# Patient Record
Sex: MALE | Race: WHITE | NOT HISPANIC OR LATINO
[De-identification: names, ages, dates, MRNs, and addresses within clinical notes are randomized per-mention and may not be internally consistent; named-entity substitution may affect disease eponyms.]

---

## 2018-05-17 ENCOUNTER — TRANSCRIPTION ENCOUNTER (OUTPATIENT)
Age: 33
End: 2018-05-17

## 2018-10-09 ENCOUNTER — APPOINTMENT (OUTPATIENT)
Dept: ULTRASOUND IMAGING | Facility: HOSPITAL | Age: 33
End: 2018-10-09

## 2018-10-12 ENCOUNTER — APPOINTMENT (OUTPATIENT)
Dept: CV DIAGNOSITCS | Facility: HOSPITAL | Age: 33
End: 2018-10-12
Payer: COMMERCIAL

## 2018-10-12 ENCOUNTER — OUTPATIENT (OUTPATIENT)
Dept: OUTPATIENT SERVICES | Facility: HOSPITAL | Age: 33
LOS: 1 days | End: 2018-10-12

## 2018-10-12 DIAGNOSIS — I11.9 HYPERTENSIVE HEART DISEASE WITHOUT HEART FAILURE: ICD-10-CM

## 2018-10-12 PROCEDURE — 93306 TTE W/DOPPLER COMPLETE: CPT | Mod: 26

## 2018-10-26 ENCOUNTER — TRANSCRIPTION ENCOUNTER (OUTPATIENT)
Age: 33
End: 2018-10-26

## 2019-02-27 ENCOUNTER — APPOINTMENT (OUTPATIENT)
Dept: OTOLARYNGOLOGY | Facility: CLINIC | Age: 34
End: 2019-02-27
Payer: COMMERCIAL

## 2019-02-27 VITALS
DIASTOLIC BLOOD PRESSURE: 79 MMHG | HEART RATE: 75 BPM | BODY MASS INDEX: 31.84 KG/M2 | SYSTOLIC BLOOD PRESSURE: 132 MMHG | WEIGHT: 215 LBS | HEIGHT: 69 IN

## 2019-02-27 DIAGNOSIS — J34.2 DEVIATED NASAL SEPTUM: ICD-10-CM

## 2019-02-27 DIAGNOSIS — G47.33 OBSTRUCTIVE SLEEP APNEA (ADULT) (PEDIATRIC): ICD-10-CM

## 2019-02-27 PROCEDURE — 99204 OFFICE O/P NEW MOD 45 MIN: CPT

## 2019-02-27 NOTE — HISTORY OF PRESENT ILLNESS
[de-identified] : Mr. COMER is a 33 year male referred for deviated septum and mild YAIMA. \par no daytime sleepiness, previously had nose bleeds then found to have elevated BP.  PCP working up causes of HTN sent for PST - demonstrated AHI 4.3\par - nose bleeds resolved with htn tx. \par + snoring\par  denies sinus pressure, rhinorrhea, nasal obstruction

## 2019-02-27 NOTE — CONSULT LETTER
[Dear  ___] : Dear  [unfilled], [Consult Letter:] : I had the pleasure of evaluating your patient, [unfilled]. [Please see my note below.] : Please see my note below. [Consult Closing:] : Thank you very much for allowing me to participate in the care of this patient.  If you have any questions, please do not hesitate to contact me. [FreeTextEntry3] : Sincerely, \par \par Marisela Rudd MD\par Otolaryngology- Facial Plastics \par [address]\[place] [address]\par (P) - [phone]\par (F) - [phone]

## 2019-02-27 NOTE — ASSESSMENT
[FreeTextEntry1] : Mr. COMER is a 33 year male with borderline YAIMA (AHI 4.3 - advised that typically AHI > 5 is diagnostic of sleep apnea in an adult).  \par - offered referral to pulmonary - sleep study was done at home and may not be reliable\par - septoplasty would not affect sleep apnea, only cpap compliance\par - no signifcant nasal obstruciton\par - epistaxis resolved\par - small tonsils, short palate\par - do not recommend septoplasty or any sleep surgery at this time\par - will call if nasal obstruction becomes bothersome or if he wants to see pulmonary.  He declines at this time.

## 2019-02-27 NOTE — PHYSICAL EXAM
[] : septum deviated to the right [Midline] : trachea located in midline position [Normal] : no rashes

## 2019-03-21 ENCOUNTER — TRANSCRIPTION ENCOUNTER (OUTPATIENT)
Age: 34
End: 2019-03-21

## 2019-08-21 ENCOUNTER — APPOINTMENT (OUTPATIENT)
Dept: ALLERGY | Facility: CLINIC | Age: 34
End: 2019-08-21
Payer: COMMERCIAL

## 2019-08-21 VITALS
WEIGHT: 211 LBS | DIASTOLIC BLOOD PRESSURE: 89 MMHG | OXYGEN SATURATION: 98 % | HEART RATE: 70 BPM | SYSTOLIC BLOOD PRESSURE: 142 MMHG | BODY MASS INDEX: 31.25 KG/M2 | HEIGHT: 69 IN

## 2019-08-21 DIAGNOSIS — Z87.891 PERSONAL HISTORY OF NICOTINE DEPENDENCE: ICD-10-CM

## 2019-08-21 PROCEDURE — 99204 OFFICE O/P NEW MOD 45 MIN: CPT | Mod: 25

## 2019-08-21 PROCEDURE — 95018 ALL TSTG PERQ&IQ DRUGS/BIOL: CPT

## 2019-08-21 PROCEDURE — 95004 PERQ TESTS W/ALRGNC XTRCS: CPT

## 2019-08-21 RX ORDER — AZELASTINE HYDROCHLORIDE 137 UG/1
0.1 SPRAY, METERED NASAL TWICE DAILY
Qty: 1 | Refills: 2 | Status: ACTIVE | COMMUNITY
Start: 2019-08-21 | End: 1900-01-01

## 2019-08-21 NOTE — SOCIAL HISTORY
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

## 2019-08-21 NOTE — PHYSICAL EXAM
[Alert] : alert [Well Nourished] : well nourished [Healthy Appearance] : healthy appearance [No Acute Distress] : no acute distress [Well Developed] : well developed [No Discharge] : no discharge [Normal Pupil & Iris Size/Symmetry] : normal pupil and iris size and symmetry [No Photophobia] : no photophobia [Sclera Not Icteric] : sclera not icteric [Normal Lips/Tongue] : the lips and tongue were normal [Normal Nasal Mucosa] : the nasal mucosa was normal [Normal Tonsils] : normal tonsils [Normal Dentition] : normal dentition [No Oral Lesions or Ulcers] : no oral lesions or ulcers [Boggy Nasal Turbinates] : boggy and/or pale nasal turbinates [Clear Rhinorrhea] : clear rhinorrhea was seen [No Neck Mass] : no neck mass was observed [No LAD] : no lymphadenopathy [No Thyroid Mass] : no thyroid mass [Supple] : the neck was supple [Normal Rate and Effort] : normal respiratory rhythm and effort [No Crackles] : no crackles [Bilateral Audible Breath Sounds] : bilateral audible breath sounds [Normal Rate] : heart rate was normal  [Normal S1, S2] : normal S1 and S2 [No murmur] : no murmur [Regular Rhythm] : with a regular rhythm [Normal Cervical Lymph Nodes] : cervical [Normal Axillary Lumph Nodes] : axillary [No Rash] : no rash [Skin Intact] : skin intact  [No Skin Lesions] : no skin lesions [No Joint Swelling or Erythema] : no joint swelling or erythema [No clubbing] : no clubbing [No Edema] : no edema [No Cyanosis] : no cyanosis [Normal Mood] : mood was normal [Normal Affect] : affect was normal [Alert, Awake, Oriented as Age-Appropriate] : alert, awake, oriented as age appropriate

## 2019-08-21 NOTE — HISTORY OF PRESENT ILLNESS
[Asthma] : asthma [Eczematous rashes] : eczematous rashes [Venom Reactions] : venom reactions [Food Allergies] : food allergies [de-identified] : Nasal blockage, rhinorrhea, sneezing - worse over the past 2-3 months - he is avoiding medications secondary to his hypertension.  No associated SOB or wheezing.  In the past he has had mild fall and winter allergies.   Problems sleeping at night secondary to snoring and problems breathing thru nose.

## 2019-08-21 NOTE — ASSESSMENT
[FreeTextEntry1] : Perennial allergic rhinitis:\par \par RV environmental intradermal skin testing \par Flonase 2 puffs QD \par Azelastine BID

## 2019-09-03 ENCOUNTER — APPOINTMENT (OUTPATIENT)
Dept: ALLERGY | Facility: CLINIC | Age: 34
End: 2019-09-03
Payer: COMMERCIAL

## 2019-09-03 VITALS
HEART RATE: 76 BPM | BODY MASS INDEX: 31.84 KG/M2 | DIASTOLIC BLOOD PRESSURE: 84 MMHG | WEIGHT: 215 LBS | HEIGHT: 69 IN | OXYGEN SATURATION: 99 % | SYSTOLIC BLOOD PRESSURE: 122 MMHG

## 2019-09-03 PROCEDURE — 95024 IQ TESTS W/ALLERGENIC XTRCS: CPT

## 2019-09-03 PROCEDURE — 95018 ALL TSTG PERQ&IQ DRUGS/BIOL: CPT

## 2019-09-03 NOTE — ASSESSMENT
[FreeTextEntry1] : Seasonal allergic rhinitis:\par \par Flonase 2 puffs QD thru the end of November\par Azelastine BID prn\par RV prn

## 2019-09-03 NOTE — REASON FOR VISIT
[Routine Follow-Up] : a routine follow-up visit for [FreeTextEntry3] : allergy skin testing completion

## 2019-09-29 ENCOUNTER — TRANSCRIPTION ENCOUNTER (OUTPATIENT)
Age: 34
End: 2019-09-29

## 2019-11-01 ENCOUNTER — NON-APPOINTMENT (OUTPATIENT)
Age: 34
End: 2019-11-01

## 2019-11-01 ENCOUNTER — APPOINTMENT (OUTPATIENT)
Dept: INTERNAL MEDICINE | Facility: CLINIC | Age: 34
End: 2019-11-01
Payer: COMMERCIAL

## 2019-11-01 VITALS
SYSTOLIC BLOOD PRESSURE: 148 MMHG | HEIGHT: 69 IN | BODY MASS INDEX: 31.7 KG/M2 | WEIGHT: 214 LBS | RESPIRATION RATE: 16 BRPM | TEMPERATURE: 98.9 F | OXYGEN SATURATION: 97 % | DIASTOLIC BLOOD PRESSURE: 88 MMHG | HEART RATE: 85 BPM

## 2019-11-01 DIAGNOSIS — Z80.3 FAMILY HISTORY OF MALIGNANT NEOPLASM OF BREAST: ICD-10-CM

## 2019-11-01 DIAGNOSIS — Z82.49 FAMILY HISTORY OF ISCHEMIC HEART DISEASE AND OTHER DISEASES OF THE CIRCULATORY SYSTEM: ICD-10-CM

## 2019-11-01 PROCEDURE — G0444 DEPRESSION SCREEN ANNUAL: CPT | Mod: 59

## 2019-11-01 PROCEDURE — 36415 COLL VENOUS BLD VENIPUNCTURE: CPT

## 2019-11-01 PROCEDURE — 99385 PREV VISIT NEW AGE 18-39: CPT | Mod: 25

## 2019-11-01 PROCEDURE — 93000 ELECTROCARDIOGRAM COMPLETE: CPT

## 2019-11-01 NOTE — HEALTH RISK ASSESSMENT
[11-15] : 11-15 [Yes] : Yes [No falls in past year] : Patient reported no falls in the past year [0] : 2) Feeling down, depressed, or hopeless: Not at all (0) [With Significant Other] : lives with significant other [] :  [Fully functional (bathing, dressing, toileting, transferring, walking, feeding)] : Fully functional (bathing, dressing, toileting, transferring, walking, feeding) [Fully functional (using the telephone, shopping, preparing meals, housekeeping, doing laundry, using] : Fully functional and needs no help or supervision to perform IADLs (using the telephone, shopping, preparing meals, housekeeping, doing laundry, using transportation, managing medications and managing finances) [Smoke Detector] : smoke detector [Carbon Monoxide Detector] : carbon monoxide detector [Seat Belt] :  uses seat belt [Sunscreen] : uses sunscreen [YearQuit] : 2012 [de-identified] : social [OFG2Qwfgu] : 0 [Change in mental status noted] : No change in mental status noted [Reports changes in hearing] : Reports no changes in hearing [Reports changes in vision] : Reports no changes in vision [Reports changes in dental health] : Reports no changes in dental health [Guns at Home] : no guns at home [Travel to Developing Areas] : does not  travel to developing areas [TB Exposure] : is not being exposed to tuberculosis [Caregiver Concerns] : does not have caregiver concerns [AdvancecareDate] : 11/1/19

## 2019-11-01 NOTE — PHYSICAL EXAM
[No Acute Distress] : no acute distress [Well Nourished] : well nourished [Well Developed] : well developed [Well-Appearing] : well-appearing [Normal Sclera/Conjunctiva] : normal sclera/conjunctiva [PERRL] : pupils equal round and reactive to light [EOMI] : extraocular movements intact [Normal Outer Ear/Nose] : the outer ears and nose were normal in appearance [Normal Oropharynx] : the oropharynx was normal [No JVD] : no jugular venous distention [No Lymphadenopathy] : no lymphadenopathy [Supple] : supple [Thyroid Normal, No Nodules] : the thyroid was normal and there were no nodules present [No Respiratory Distress] : no respiratory distress  [No Accessory Muscle Use] : no accessory muscle use [Clear to Auscultation] : lungs were clear to auscultation bilaterally [Normal Rate] : normal rate  [Regular Rhythm] : with a regular rhythm [Normal S1, S2] : normal S1 and S2 [No Murmur] : no murmur heard [No Carotid Bruits] : no carotid bruits [No Abdominal Bruit] : a ~M bruit was not heard ~T in the abdomen [No Varicosities] : no varicosities [Pedal Pulses Present] : the pedal pulses are present [No Edema] : there was no peripheral edema [No Palpable Aorta] : no palpable aorta [No Extremity Clubbing/Cyanosis] : no extremity clubbing/cyanosis [Soft] : abdomen soft [Non Tender] : non-tender [Non-distended] : non-distended [No Masses] : no abdominal mass palpated [No HSM] : no HSM [Normal Bowel Sounds] : normal bowel sounds [Normal Posterior Cervical Nodes] : no posterior cervical lymphadenopathy [Normal Anterior Cervical Nodes] : no anterior cervical lymphadenopathy [No CVA Tenderness] : no CVA  tenderness [No Spinal Tenderness] : no spinal tenderness [No Joint Swelling] : no joint swelling [Grossly Normal Strength/Tone] : grossly normal strength/tone [No Rash] : no rash [Coordination Grossly Intact] : coordination grossly intact [No Focal Deficits] : no focal deficits [Normal Gait] : normal gait [Deep Tendon Reflexes (DTR)] : deep tendon reflexes were 2+ and symmetric [Normal Affect] : the affect was normal [Normal Insight/Judgement] : insight and judgment were intact [de-identified] : no abd bruits

## 2019-11-01 NOTE — HISTORY OF PRESENT ILLNESS
[FreeTextEntry1] : cpx [de-identified] : work up for secondary htn revealed left renal artery stenosis as noted. Get occ freq of urination from diuretic--home bps gen controlled

## 2019-11-02 LAB
ALBUMIN SERPL ELPH-MCNC: 5.2 G/DL
ALP BLD-CCNC: 62 U/L
ALT SERPL-CCNC: 45 U/L
ANION GAP SERPL CALC-SCNC: 15 MMOL/L
APPEARANCE: CLEAR
AST SERPL-CCNC: 25 U/L
BACTERIA: NEGATIVE
BASOPHILS # BLD AUTO: 0.07 K/UL
BASOPHILS NFR BLD AUTO: 0.8 %
BILIRUB SERPL-MCNC: 1 MG/DL
BILIRUBIN URINE: NEGATIVE
BLOOD URINE: NEGATIVE
BUN SERPL-MCNC: 18 MG/DL
CALCIUM SERPL-MCNC: 10 MG/DL
CHLORIDE SERPL-SCNC: 96 MMOL/L
CHOLEST SERPL-MCNC: 188 MG/DL
CHOLEST/HDLC SERPL: 4.2 RATIO
CO2 SERPL-SCNC: 29 MMOL/L
COLOR: YELLOW
CREAT SERPL-MCNC: 1.16 MG/DL
EOSINOPHIL # BLD AUTO: 0.29 K/UL
EOSINOPHIL NFR BLD AUTO: 3.4 %
GLUCOSE QUALITATIVE U: NEGATIVE
GLUCOSE SERPL-MCNC: 91 MG/DL
HCT VFR BLD CALC: 49.4 %
HDLC SERPL-MCNC: 45 MG/DL
HGB BLD-MCNC: 16 G/DL
HYALINE CASTS: 1 /LPF
IMM GRANULOCYTES NFR BLD AUTO: 0.2 %
KETONES URINE: NEGATIVE
LDLC SERPL CALC-MCNC: 116 MG/DL
LEUKOCYTE ESTERASE URINE: NEGATIVE
LYMPHOCYTES # BLD AUTO: 2.92 K/UL
LYMPHOCYTES NFR BLD AUTO: 34.6 %
MAN DIFF?: NORMAL
MCHC RBC-ENTMCNC: 28.9 PG
MCHC RBC-ENTMCNC: 32.4 GM/DL
MCV RBC AUTO: 89.3 FL
MICROSCOPIC-UA: NORMAL
MONOCYTES # BLD AUTO: 0.71 K/UL
MONOCYTES NFR BLD AUTO: 8.4 %
NEUTROPHILS # BLD AUTO: 4.43 K/UL
NEUTROPHILS NFR BLD AUTO: 52.6 %
NITRITE URINE: NEGATIVE
PH URINE: 7
PLATELET # BLD AUTO: 237 K/UL
POTASSIUM SERPL-SCNC: 3.6 MMOL/L
PROT SERPL-MCNC: 7.5 G/DL
PROTEIN URINE: NORMAL
RBC # BLD: 5.53 M/UL
RBC # FLD: 13.5 %
RED BLOOD CELLS URINE: 1 /HPF
SODIUM SERPL-SCNC: 140 MMOL/L
SPECIFIC GRAVITY URINE: 1.02
SQUAMOUS EPITHELIAL CELLS: 0 /HPF
TRIGL SERPL-MCNC: 134 MG/DL
TSH SERPL-ACNC: 1.64 UIU/ML
UROBILINOGEN URINE: NORMAL
WBC # FLD AUTO: 8.44 K/UL
WHITE BLOOD CELLS URINE: 0 /HPF

## 2019-12-09 ENCOUNTER — MEDICATION RENEWAL (OUTPATIENT)
Age: 34
End: 2019-12-09

## 2019-12-09 RX ORDER — FLUTICASONE PROPIONATE 50 UG/1
50 SPRAY, METERED NASAL DAILY
Qty: 1 | Refills: 2 | Status: ACTIVE | COMMUNITY
Start: 2019-08-21 | End: 1900-01-01

## 2020-01-17 ENCOUNTER — APPOINTMENT (OUTPATIENT)
Dept: UROLOGY | Facility: CLINIC | Age: 35
End: 2020-01-17
Payer: COMMERCIAL

## 2020-01-17 VITALS
RESPIRATION RATE: 14 BRPM | TEMPERATURE: 98.6 F | HEIGHT: 69 IN | WEIGHT: 218 LBS | SYSTOLIC BLOOD PRESSURE: 137 MMHG | BODY MASS INDEX: 32.29 KG/M2 | DIASTOLIC BLOOD PRESSURE: 84 MMHG | HEART RATE: 88 BPM | OXYGEN SATURATION: 98 %

## 2020-01-17 DIAGNOSIS — Z86.39 PERSONAL HISTORY OF OTHER ENDOCRINE, NUTRITIONAL AND METABOLIC DISEASE: ICD-10-CM

## 2020-01-17 DIAGNOSIS — Z86.79 PERSONAL HISTORY OF OTHER DISEASES OF THE CIRCULATORY SYSTEM: ICD-10-CM

## 2020-01-17 DIAGNOSIS — Z80.42 FAMILY HISTORY OF MALIGNANT NEOPLASM OF PROSTATE: ICD-10-CM

## 2020-01-17 PROCEDURE — 99203 OFFICE O/P NEW LOW 30 MIN: CPT

## 2020-01-17 NOTE — HISTORY OF PRESENT ILLNESS
[FreeTextEntry1] : He is a 34-year-old man who is seen today for initial visit with his wife. He has 2 children. He is interested in vasectomy. He does not have any urological complaints. There is a family history of urological diseases.

## 2020-01-17 NOTE — REVIEW OF SYSTEMS
[Abdominal Pain] : abdominal pain [History of kidney stones] : history of kidney stones [Itching] : itching [Negative] : Heme/Lymph [FreeTextEntry2] : high blood pressure

## 2020-01-17 NOTE — PHYSICAL EXAM
[General Appearance - Well Developed] : well developed [General Appearance - Well Nourished] : well nourished [Normal Appearance] : normal appearance [Well Groomed] : well groomed [General Appearance - In No Acute Distress] : no acute distress [Edema] : no peripheral edema [Respiration, Rhythm And Depth] : normal respiratory rhythm and effort [Exaggerated Use Of Accessory Muscles For Inspiration] : no accessory muscle use [Abdomen Soft] : soft [Abdomen Tenderness] : non-tender [Costovertebral Angle Tenderness] : no ~M costovertebral angle tenderness [Urethral Meatus] : meatus normal [Penis Abnormality] : normal circumcised penis [Urinary Bladder Findings] : the bladder was normal on palpation [Scrotum] : the scrotum was normal [Testes Tenderness] : no tenderness of the testes [Testes Mass (___cm)] : there were no testicular masses [Normal Station and Gait] : the gait and station were normal for the patient's age [No Focal Deficits] : no focal deficits [] : no rash [Oriented To Time, Place, And Person] : oriented to person, place, and time [Mood] : the mood was normal [Affect] : the affect was normal [Not Anxious] : not anxious [Inguinal Lymph Nodes Enlarged Bilaterally] : inguinal

## 2020-01-17 NOTE — ASSESSMENT
[FreeTextEntry1] : He is interested in proceeding with vasectomy. Different surgical techniques and their outcomes were discussed. Risks of the procedure discussed included but were not limited to bleeding, infection, testicular injury or atrophy, failure of the procedure and need for redo procedure, etc. Post-vasectomy pain syndrome was discussed. Also, studies discussing the link between vasectomy and prostate cancer were discussed. Use of local vs. general anesthesia was discussed. The procedure will be scheduled in the near future.\par \par Zheng Wynne MD, FACS\par [facility]\par  of Urology\par \par [address], [address]\[place] [address]\par \par [address], [address]\[place] [address]\par \par Tel: [phone]\par Fax: [phone]

## 2020-02-19 ENCOUNTER — APPOINTMENT (OUTPATIENT)
Dept: MRI IMAGING | Facility: CLINIC | Age: 35
End: 2020-02-19
Payer: COMMERCIAL

## 2020-02-19 ENCOUNTER — OUTPATIENT (OUTPATIENT)
Dept: OUTPATIENT SERVICES | Facility: HOSPITAL | Age: 35
LOS: 1 days | End: 2020-02-19
Payer: COMMERCIAL

## 2020-02-19 DIAGNOSIS — M99.03 SEGMENTAL AND SOMATIC DYSFUNCTION OF LUMBAR REGION: ICD-10-CM

## 2020-02-19 PROCEDURE — 72148 MRI LUMBAR SPINE W/O DYE: CPT | Mod: 26

## 2020-02-19 PROCEDURE — 72148 MRI LUMBAR SPINE W/O DYE: CPT

## 2020-03-06 ENCOUNTER — LABORATORY RESULT (OUTPATIENT)
Age: 35
End: 2020-03-06

## 2020-03-06 ENCOUNTER — APPOINTMENT (OUTPATIENT)
Dept: UROLOGY | Facility: CLINIC | Age: 35
End: 2020-03-06
Payer: COMMERCIAL

## 2020-03-06 VITALS
HEIGHT: 69 IN | SYSTOLIC BLOOD PRESSURE: 139 MMHG | BODY MASS INDEX: 32.29 KG/M2 | HEART RATE: 91 BPM | WEIGHT: 218 LBS | OXYGEN SATURATION: 99 % | DIASTOLIC BLOOD PRESSURE: 79 MMHG | RESPIRATION RATE: 14 BRPM

## 2020-03-06 PROCEDURE — 55250 REMOVAL OF SPERM DUCT(S): CPT

## 2020-04-26 ENCOUNTER — MESSAGE (OUTPATIENT)
Age: 35
End: 2020-04-26

## 2020-04-30 ENCOUNTER — APPOINTMENT (OUTPATIENT)
Dept: UROLOGY | Facility: CLINIC | Age: 35
End: 2020-04-30

## 2020-04-30 LAB
SARS-COV-2 IGG SERPL IA-ACNC: <0.1 INDEX
SARS-COV-2 IGG SERPL QL IA: NEGATIVE

## 2020-06-10 ENCOUNTER — APPOINTMENT (OUTPATIENT)
Dept: UROLOGY | Facility: CLINIC | Age: 35
End: 2020-06-10
Payer: COMMERCIAL

## 2020-06-10 VITALS
BODY MASS INDEX: 32.29 KG/M2 | TEMPERATURE: 98.6 F | OXYGEN SATURATION: 98 % | RESPIRATION RATE: 13 BRPM | SYSTOLIC BLOOD PRESSURE: 135 MMHG | HEIGHT: 69 IN | DIASTOLIC BLOOD PRESSURE: 86 MMHG | HEART RATE: 94 BPM | WEIGHT: 218 LBS

## 2020-06-10 DIAGNOSIS — Z30.09 ENCOUNTER FOR OTHER GENERAL COUNSELING AND ADVICE ON CONTRACEPTION: ICD-10-CM

## 2020-06-10 PROCEDURE — 99213 OFFICE O/P EST LOW 20 MIN: CPT

## 2020-06-10 NOTE — ASSESSMENT
[FreeTextEntry1] : His examination is unremarkable. He does not have any pain. It has been 3 months since his procedure. He can go ahead and give specimen for semen analysis. He should continue to use barrier protection until results come back.

## 2020-06-10 NOTE — PHYSICAL EXAM
[General Appearance - Well Developed] : well developed [General Appearance - Well Nourished] : well nourished [Normal Appearance] : normal appearance [General Appearance - In No Acute Distress] : no acute distress [Well Groomed] : well groomed [Abdomen Tenderness] : non-tender [Abdomen Soft] : soft [Costovertebral Angle Tenderness] : no ~M costovertebral angle tenderness [Urethral Meatus] : meatus normal [Penis Abnormality] : normal circumcised penis [Urinary Bladder Findings] : the bladder was normal on palpation [Testes Mass (___cm)] : there were no testicular masses [Testes Tenderness] : no tenderness of the testes [Scrotum] : the scrotum was normal [Respiration, Rhythm And Depth] : normal respiratory rhythm and effort [] : no respiratory distress [Exaggerated Use Of Accessory Muscles For Inspiration] : no accessory muscle use

## 2020-06-10 NOTE — HISTORY OF PRESENT ILLNESS
[FreeTextEntry1] : He is a 35 year-old man who is seen today after vasectomy on 3/6/2020. He does not have any urological complaints. Postvasectomy pain and discomfort have resolved.

## 2020-07-22 ENCOUNTER — RX RENEWAL (OUTPATIENT)
Age: 35
End: 2020-07-22

## 2020-08-10 ENCOUNTER — APPOINTMENT (OUTPATIENT)
Dept: INTERNAL MEDICINE | Facility: CLINIC | Age: 35
End: 2020-08-10
Payer: COMMERCIAL

## 2020-08-10 VITALS
DIASTOLIC BLOOD PRESSURE: 84 MMHG | OXYGEN SATURATION: 98 % | WEIGHT: 212 LBS | SYSTOLIC BLOOD PRESSURE: 128 MMHG | RESPIRATION RATE: 16 BRPM | HEART RATE: 91 BPM | TEMPERATURE: 98.6 F | BODY MASS INDEX: 31.4 KG/M2 | HEIGHT: 69 IN

## 2020-08-10 PROCEDURE — 99213 OFFICE O/P EST LOW 20 MIN: CPT | Mod: 25

## 2020-08-10 PROCEDURE — 36415 COLL VENOUS BLD VENIPUNCTURE: CPT

## 2020-08-10 RX ORDER — TRIAMTERENE AND HYDROCHLOROTHIAZIDE 25; 37.5 MG/1; MG/1
37.5-25 TABLET ORAL
Qty: 90 | Refills: 1 | Status: ACTIVE | COMMUNITY

## 2020-08-10 NOTE — HISTORY OF PRESENT ILLNESS
[Hyperlipidemia] : Hyperlipidemia [Patient was last seen on ___] : Patient was last seen on [unfilled] [Hypertension] : Hypertension [Does not check BP] : The patient is not checking blood pressure [<140/90] : Target blood pressure is <140/90 [Target goal met] : BP target goal met [Doing Well] : Patient is doing well [Managed with medications] : managed with  medication

## 2020-08-10 NOTE — PHYSICAL EXAM
[Pedal Pulses Present] : the pedal pulses are present [No Varicosities] : no varicosities [No Extremity Clubbing/Cyanosis] : no extremity clubbing/cyanosis [No Edema] : there was no peripheral edema [Normal] : affect was normal and insight and judgment were intact

## 2020-08-11 LAB
ALBUMIN SERPL ELPH-MCNC: 5.3 G/DL
ALP BLD-CCNC: 58 U/L
ALT SERPL-CCNC: 36 U/L
ANION GAP SERPL CALC-SCNC: 12 MMOL/L
AST SERPL-CCNC: 20 U/L
BASOPHILS # BLD AUTO: 0.06 K/UL
BASOPHILS NFR BLD AUTO: 0.8 %
BILIRUB SERPL-MCNC: 1 MG/DL
BUN SERPL-MCNC: 18 MG/DL
CALCIUM SERPL-MCNC: 9.9 MG/DL
CHLORIDE SERPL-SCNC: 98 MMOL/L
CHOLEST SERPL-MCNC: 173 MG/DL
CHOLEST/HDLC SERPL: 4.2 RATIO
CO2 SERPL-SCNC: 30 MMOL/L
CREAT SERPL-MCNC: 1.21 MG/DL
EOSINOPHIL # BLD AUTO: 0.26 K/UL
EOSINOPHIL NFR BLD AUTO: 3.5 %
FERRITIN SERPL-MCNC: 152 NG/ML
GLUCOSE SERPL-MCNC: 100 MG/DL
HCT VFR BLD CALC: 50.9 %
HDLC SERPL-MCNC: 41 MG/DL
HGB BLD-MCNC: 16.5 G/DL
IMM GRANULOCYTES NFR BLD AUTO: 0.1 %
IRON SATN MFR SERPL: 26 %
IRON SERPL-MCNC: 89 UG/DL
LDLC SERPL CALC-MCNC: 100 MG/DL
LYMPHOCYTES # BLD AUTO: 2.35 K/UL
LYMPHOCYTES NFR BLD AUTO: 31.8 %
MAN DIFF?: NORMAL
MCHC RBC-ENTMCNC: 29.3 PG
MCHC RBC-ENTMCNC: 32.4 GM/DL
MCV RBC AUTO: 90.4 FL
MONOCYTES # BLD AUTO: 0.73 K/UL
MONOCYTES NFR BLD AUTO: 9.9 %
NEUTROPHILS # BLD AUTO: 3.99 K/UL
NEUTROPHILS NFR BLD AUTO: 53.9 %
PLATELET # BLD AUTO: 228 K/UL
POTASSIUM SERPL-SCNC: 3.8 MMOL/L
PROT SERPL-MCNC: 7.6 G/DL
RBC # BLD: 5.63 M/UL
RBC # FLD: 12.8 %
SODIUM SERPL-SCNC: 140 MMOL/L
TIBC SERPL-MCNC: 344 UG/DL
TRIGL SERPL-MCNC: 159 MG/DL
UIBC SERPL-MCNC: 254 UG/DL
WBC # FLD AUTO: 7.4 K/UL

## 2020-09-17 ENCOUNTER — TRANSCRIPTION ENCOUNTER (OUTPATIENT)
Age: 35
End: 2020-09-17

## 2020-10-01 ENCOUNTER — APPOINTMENT (OUTPATIENT)
Dept: DERMATOLOGY | Facility: CLINIC | Age: 35
End: 2020-10-01
Payer: COMMERCIAL

## 2020-10-01 DIAGNOSIS — D23.72 OTHER BENIGN NEOPLASM OF SKIN OF LEFT LOWER LIMB, INCLUDING HIP: ICD-10-CM

## 2020-10-01 DIAGNOSIS — L28.1 PRURIGO NODULARIS: ICD-10-CM

## 2020-10-01 DIAGNOSIS — R23.4 CHANGES IN SKIN TEXTURE: ICD-10-CM

## 2020-10-01 DIAGNOSIS — B36.0 PITYRIASIS VERSICOLOR: ICD-10-CM

## 2020-10-01 PROCEDURE — 11900 INJECT SKIN LESIONS </W 7: CPT

## 2020-10-01 PROCEDURE — 99203 OFFICE O/P NEW LOW 30 MIN: CPT | Mod: 25

## 2020-10-01 RX ORDER — KETOCONAZOLE 20 MG/G
2 CREAM TOPICAL
Qty: 1 | Refills: 5 | Status: ACTIVE | COMMUNITY
Start: 2020-10-01 | End: 1900-01-01

## 2020-10-01 RX ORDER — FLUCONAZOLE 200 MG/1
200 TABLET ORAL
Qty: 2 | Refills: 0 | Status: ACTIVE | COMMUNITY
Start: 2020-10-01 | End: 1900-01-01

## 2020-10-01 RX ORDER — AMMONIUM LACTATE 12 %
12 CREAM (GRAM) TOPICAL
Qty: 2 | Refills: 11 | Status: ACTIVE | COMMUNITY
Start: 2020-10-01 | End: 1900-01-01

## 2020-10-01 RX ORDER — BETAMETHASONE DIPROPIONATE 0.5 MG/G
0.05 OINTMENT, AUGMENTED TOPICAL
Qty: 1 | Refills: 0 | Status: ACTIVE | COMMUNITY
Start: 2020-10-01 | End: 1900-01-01

## 2020-10-01 RX ORDER — KETOCONAZOLE 20.5 MG/ML
2 SHAMPOO, SUSPENSION TOPICAL
Qty: 2 | Refills: 11 | Status: ACTIVE | COMMUNITY
Start: 2020-10-01 | End: 1900-01-01

## 2020-10-02 PROBLEM — R23.4 SCALING OF SKIN: Status: ACTIVE | Noted: 2020-10-02

## 2020-10-02 PROBLEM — D23.72 DERMATOFIBROMA OF LEFT LOWER LEG: Status: ACTIVE | Noted: 2020-10-02

## 2020-12-08 ENCOUNTER — APPOINTMENT (OUTPATIENT)
Dept: DERMATOLOGY | Facility: CLINIC | Age: 35
End: 2020-12-08

## 2020-12-14 ENCOUNTER — TRANSCRIPTION ENCOUNTER (OUTPATIENT)
Age: 35
End: 2020-12-14

## 2020-12-15 ENCOUNTER — RX RENEWAL (OUTPATIENT)
Age: 35
End: 2020-12-15

## 2021-01-11 ENCOUNTER — TRANSCRIPTION ENCOUNTER (OUTPATIENT)
Age: 36
End: 2021-01-11

## 2021-01-23 ENCOUNTER — TRANSCRIPTION ENCOUNTER (OUTPATIENT)
Age: 36
End: 2021-01-23

## 2021-01-24 ENCOUNTER — TRANSCRIPTION ENCOUNTER (OUTPATIENT)
Age: 36
End: 2021-01-24

## 2021-01-24 ENCOUNTER — INPATIENT (INPATIENT)
Facility: HOSPITAL | Age: 36
LOS: 0 days | Discharge: HOME | DRG: 343 | End: 2021-01-24
Attending: SURGERY | Admitting: SURGERY
Payer: COMMERCIAL

## 2021-01-24 ENCOUNTER — RESULT REVIEW (OUTPATIENT)
Age: 36
End: 2021-01-24

## 2021-01-24 VITALS
TEMPERATURE: 100 F | HEART RATE: 94 BPM | SYSTOLIC BLOOD PRESSURE: 158 MMHG | OXYGEN SATURATION: 98 % | DIASTOLIC BLOOD PRESSURE: 95 MMHG | HEIGHT: 69 IN | WEIGHT: 218.04 LBS | RESPIRATION RATE: 16 BRPM

## 2021-01-24 VITALS
RESPIRATION RATE: 14 BRPM | HEART RATE: 65 BPM | DIASTOLIC BLOOD PRESSURE: 69 MMHG | SYSTOLIC BLOOD PRESSURE: 129 MMHG | OXYGEN SATURATION: 97 %

## 2021-01-24 DIAGNOSIS — K35.80 UNSPECIFIED ACUTE APPENDICITIS: ICD-10-CM

## 2021-01-24 LAB
ALBUMIN SERPL ELPH-MCNC: 3.8 G/DL — SIGNIFICANT CHANGE UP (ref 3.3–5)
ALP SERPL-CCNC: 61 U/L — SIGNIFICANT CHANGE UP (ref 40–120)
ALT FLD-CCNC: 38 U/L — SIGNIFICANT CHANGE UP (ref 12–78)
ANION GAP SERPL CALC-SCNC: 4 MMOL/L — LOW (ref 5–17)
APPEARANCE UR: CLEAR — SIGNIFICANT CHANGE UP
APTT BLD: 32.3 SEC — SIGNIFICANT CHANGE UP (ref 27.5–35.5)
AST SERPL-CCNC: 16 U/L — SIGNIFICANT CHANGE UP (ref 15–37)
BASOPHILS # BLD AUTO: 0.04 K/UL — SIGNIFICANT CHANGE UP (ref 0–0.2)
BASOPHILS NFR BLD AUTO: 0.4 % — SIGNIFICANT CHANGE UP (ref 0–2)
BILIRUB SERPL-MCNC: 1.2 MG/DL — SIGNIFICANT CHANGE UP (ref 0.2–1.2)
BILIRUB UR-MCNC: NEGATIVE — SIGNIFICANT CHANGE UP
BUN SERPL-MCNC: 16 MG/DL — SIGNIFICANT CHANGE UP (ref 7–23)
CALCIUM SERPL-MCNC: 8.6 MG/DL — SIGNIFICANT CHANGE UP (ref 8.5–10.1)
CHLORIDE SERPL-SCNC: 103 MMOL/L — SIGNIFICANT CHANGE UP (ref 96–108)
CO2 SERPL-SCNC: 32 MMOL/L — HIGH (ref 22–31)
COLOR SPEC: YELLOW — SIGNIFICANT CHANGE UP
CREAT SERPL-MCNC: 1.3 MG/DL — SIGNIFICANT CHANGE UP (ref 0.5–1.3)
DIFF PNL FLD: NEGATIVE — SIGNIFICANT CHANGE UP
EOSINOPHIL # BLD AUTO: 0 K/UL — SIGNIFICANT CHANGE UP (ref 0–0.5)
EOSINOPHIL NFR BLD AUTO: 0 % — SIGNIFICANT CHANGE UP (ref 0–6)
GLUCOSE SERPL-MCNC: 117 MG/DL — HIGH (ref 70–99)
GLUCOSE UR QL: NEGATIVE — SIGNIFICANT CHANGE UP
HCT VFR BLD CALC: 44.8 % — SIGNIFICANT CHANGE UP (ref 39–50)
HGB BLD-MCNC: 15.3 G/DL — SIGNIFICANT CHANGE UP (ref 13–17)
IMM GRANULOCYTES NFR BLD AUTO: 0.4 % — SIGNIFICANT CHANGE UP (ref 0–1.5)
INR BLD: 1.36 RATIO — HIGH (ref 0.88–1.16)
KETONES UR-MCNC: NEGATIVE — SIGNIFICANT CHANGE UP
LEUKOCYTE ESTERASE UR-ACNC: NEGATIVE — SIGNIFICANT CHANGE UP
LIDOCAIN IGE QN: 136 U/L — SIGNIFICANT CHANGE UP (ref 73–393)
LYMPHOCYTES # BLD AUTO: 1.28 K/UL — SIGNIFICANT CHANGE UP (ref 1–3.3)
LYMPHOCYTES # BLD AUTO: 13.8 % — SIGNIFICANT CHANGE UP (ref 13–44)
MCHC RBC-ENTMCNC: 29.3 PG — SIGNIFICANT CHANGE UP (ref 27–34)
MCHC RBC-ENTMCNC: 34.2 GM/DL — SIGNIFICANT CHANGE UP (ref 32–36)
MCV RBC AUTO: 85.7 FL — SIGNIFICANT CHANGE UP (ref 80–100)
MONOCYTES # BLD AUTO: 1.14 K/UL — HIGH (ref 0–0.9)
MONOCYTES NFR BLD AUTO: 12.3 % — SIGNIFICANT CHANGE UP (ref 2–14)
NEUTROPHILS # BLD AUTO: 6.76 K/UL — SIGNIFICANT CHANGE UP (ref 1.8–7.4)
NEUTROPHILS NFR BLD AUTO: 73.1 % — SIGNIFICANT CHANGE UP (ref 43–77)
NITRITE UR-MCNC: NEGATIVE — SIGNIFICANT CHANGE UP
NRBC # BLD: 0 /100 WBCS — SIGNIFICANT CHANGE UP (ref 0–0)
PH UR: 6.5 — SIGNIFICANT CHANGE UP (ref 5–8)
PLATELET # BLD AUTO: 210 K/UL — SIGNIFICANT CHANGE UP (ref 150–400)
POTASSIUM SERPL-MCNC: 3.5 MMOL/L — SIGNIFICANT CHANGE UP (ref 3.5–5.3)
POTASSIUM SERPL-SCNC: 3.5 MMOL/L — SIGNIFICANT CHANGE UP (ref 3.5–5.3)
PROT SERPL-MCNC: 7.3 G/DL — SIGNIFICANT CHANGE UP (ref 6–8.3)
PROT UR-MCNC: 15
PROTHROM AB SERPL-ACNC: 15.7 SEC — HIGH (ref 10.6–13.6)
RBC # BLD: 5.23 M/UL — SIGNIFICANT CHANGE UP (ref 4.2–5.8)
RBC # FLD: 13.2 % — SIGNIFICANT CHANGE UP (ref 10.3–14.5)
SARS-COV-2 IGG SERPL QL IA: NEGATIVE — SIGNIFICANT CHANGE UP
SARS-COV-2 IGM SERPL IA-ACNC: 0.07 INDEX — SIGNIFICANT CHANGE UP
SARS-COV-2 RNA SPEC QL NAA+PROBE: SIGNIFICANT CHANGE UP
SODIUM SERPL-SCNC: 139 MMOL/L — SIGNIFICANT CHANGE UP (ref 135–145)
SP GR SPEC: 1.01 — SIGNIFICANT CHANGE UP (ref 1.01–1.02)
UROBILINOGEN FLD QL: NEGATIVE — SIGNIFICANT CHANGE UP
WBC # BLD: 9.26 K/UL — SIGNIFICANT CHANGE UP (ref 3.8–10.5)
WBC # FLD AUTO: 9.26 K/UL — SIGNIFICANT CHANGE UP (ref 3.8–10.5)

## 2021-01-24 PROCEDURE — 83690 ASSAY OF LIPASE: CPT

## 2021-01-24 PROCEDURE — 80053 COMPREHEN METABOLIC PANEL: CPT

## 2021-01-24 PROCEDURE — G1004: CPT

## 2021-01-24 PROCEDURE — 86900 BLOOD TYPING SEROLOGIC ABO: CPT

## 2021-01-24 PROCEDURE — 86850 RBC ANTIBODY SCREEN: CPT

## 2021-01-24 PROCEDURE — C1889: CPT

## 2021-01-24 PROCEDURE — 88304 TISSUE EXAM BY PATHOLOGIST: CPT

## 2021-01-24 PROCEDURE — 74177 CT ABD & PELVIS W/CONTRAST: CPT | Mod: 26,ME

## 2021-01-24 PROCEDURE — U0003: CPT

## 2021-01-24 PROCEDURE — 44970 LAPAROSCOPY APPENDECTOMY: CPT | Mod: AS

## 2021-01-24 PROCEDURE — 86901 BLOOD TYPING SEROLOGIC RH(D): CPT

## 2021-01-24 PROCEDURE — U0005: CPT

## 2021-01-24 PROCEDURE — 36415 COLL VENOUS BLD VENIPUNCTURE: CPT

## 2021-01-24 PROCEDURE — 85025 COMPLETE CBC W/AUTO DIFF WBC: CPT

## 2021-01-24 PROCEDURE — 88304 TISSUE EXAM BY PATHOLOGIST: CPT | Mod: 26

## 2021-01-24 PROCEDURE — 86769 SARS-COV-2 COVID-19 ANTIBODY: CPT

## 2021-01-24 PROCEDURE — 81001 URINALYSIS AUTO W/SCOPE: CPT

## 2021-01-24 PROCEDURE — 85610 PROTHROMBIN TIME: CPT

## 2021-01-24 PROCEDURE — 99285 EMERGENCY DEPT VISIT HI MDM: CPT | Mod: 25

## 2021-01-24 PROCEDURE — 99285 EMERGENCY DEPT VISIT HI MDM: CPT

## 2021-01-24 PROCEDURE — 85730 THROMBOPLASTIN TIME PARTIAL: CPT

## 2021-01-24 PROCEDURE — 74177 CT ABD & PELVIS W/CONTRAST: CPT

## 2021-01-24 RX ORDER — HYDROMORPHONE HYDROCHLORIDE 2 MG/ML
0.5 INJECTION INTRAMUSCULAR; INTRAVENOUS; SUBCUTANEOUS EVERY 4 HOURS
Refills: 0 | Status: DISCONTINUED | OUTPATIENT
Start: 2021-01-24 | End: 2021-01-24

## 2021-01-24 RX ORDER — OXYCODONE AND ACETAMINOPHEN 5; 325 MG/1; MG/1
1 TABLET ORAL
Qty: 20 | Refills: 0
Start: 2021-01-24

## 2021-01-24 RX ORDER — SODIUM CHLORIDE 9 MG/ML
1000 INJECTION, SOLUTION INTRAVENOUS
Refills: 0 | Status: DISCONTINUED | OUTPATIENT
Start: 2021-01-24 | End: 2021-01-24

## 2021-01-24 RX ORDER — PIPERACILLIN AND TAZOBACTAM 4; .5 G/20ML; G/20ML
3.38 INJECTION, POWDER, LYOPHILIZED, FOR SOLUTION INTRAVENOUS ONCE
Refills: 0 | Status: COMPLETED | OUTPATIENT
Start: 2021-01-24 | End: 2021-01-24

## 2021-01-24 RX ORDER — PIPERACILLIN AND TAZOBACTAM 4; .5 G/20ML; G/20ML
3.38 INJECTION, POWDER, LYOPHILIZED, FOR SOLUTION INTRAVENOUS EVERY 8 HOURS
Refills: 0 | Status: DISCONTINUED | OUTPATIENT
Start: 2021-01-24 | End: 2021-01-24

## 2021-01-24 RX ORDER — SODIUM CHLORIDE 9 MG/ML
1000 INJECTION INTRAMUSCULAR; INTRAVENOUS; SUBCUTANEOUS ONCE
Refills: 0 | Status: COMPLETED | OUTPATIENT
Start: 2021-01-24 | End: 2021-01-24

## 2021-01-24 RX ORDER — CEFAZOLIN SODIUM 1 G
2000 VIAL (EA) INJECTION ONCE
Refills: 0 | Status: DISCONTINUED | OUTPATIENT
Start: 2021-01-24 | End: 2021-01-24

## 2021-01-24 RX ORDER — OXYCODONE HYDROCHLORIDE 5 MG/1
5 TABLET ORAL ONCE
Refills: 0 | Status: DISCONTINUED | OUTPATIENT
Start: 2021-01-24 | End: 2021-01-24

## 2021-01-24 RX ORDER — ONDANSETRON 8 MG/1
4 TABLET, FILM COATED ORAL EVERY 6 HOURS
Refills: 0 | Status: DISCONTINUED | OUTPATIENT
Start: 2021-01-24 | End: 2021-01-24

## 2021-01-24 RX ORDER — HYDROMORPHONE HYDROCHLORIDE 2 MG/ML
1 INJECTION INTRAMUSCULAR; INTRAVENOUS; SUBCUTANEOUS EVERY 4 HOURS
Refills: 0 | Status: DISCONTINUED | OUTPATIENT
Start: 2021-01-24 | End: 2021-01-24

## 2021-01-24 RX ORDER — METOCLOPRAMIDE HCL 10 MG
5 TABLET ORAL ONCE
Refills: 0 | Status: DISCONTINUED | OUTPATIENT
Start: 2021-01-24 | End: 2021-01-24

## 2021-01-24 RX ORDER — HYDROMORPHONE HYDROCHLORIDE 2 MG/ML
0.5 INJECTION INTRAMUSCULAR; INTRAVENOUS; SUBCUTANEOUS
Refills: 0 | Status: DISCONTINUED | OUTPATIENT
Start: 2021-01-24 | End: 2021-01-24

## 2021-01-24 RX ADMIN — PIPERACILLIN AND TAZOBACTAM 200 GRAM(S): 4; .5 INJECTION, POWDER, LYOPHILIZED, FOR SOLUTION INTRAVENOUS at 16:58

## 2021-01-24 RX ADMIN — SODIUM CHLORIDE 1000 MILLILITER(S): 9 INJECTION INTRAMUSCULAR; INTRAVENOUS; SUBCUTANEOUS at 14:47

## 2021-01-24 RX ADMIN — SODIUM CHLORIDE 125 MILLILITER(S): 9 INJECTION, SOLUTION INTRAVENOUS at 21:27

## 2021-01-24 RX ADMIN — ONDANSETRON 4 MILLIGRAM(S): 8 TABLET, FILM COATED ORAL at 17:10

## 2021-01-24 RX ADMIN — SODIUM CHLORIDE 1000 MILLILITER(S): 9 INJECTION INTRAMUSCULAR; INTRAVENOUS; SUBCUTANEOUS at 15:28

## 2021-01-24 NOTE — ASU DISCHARGE PLAN (ADULT/PEDIATRIC) - ASU DC SPECIAL INSTRUCTIONSFT
Apply water proof ice pack 20 mins on, 20 mins off to help decrease pain and swelling. After 24 hrs, you may begin showering as usual but Do NOT peel off skin glue. Do not scrub or soak incision site. Pat dry. NO tub baths, NO swimming pools. No hot tubs.   No heavy lifting over 20 lbs.  You may take over-the-counter pain medication such as tylenol or motrin as directed as needed for pain.  A prescription for percocet has been sent to your pharmacy to take as needed for any pain NOT relieved with over-the-counter pain medication.  *NOTE: Tylenol in percocet.  Maximum daily dose of tylenol NOT to exceed 4,000mg.  Do NOT take percocet and tylenol together at the same time.   You may take an over-the-counter stool softener such as colace as needed for any constipation while taking percocet for pain.

## 2021-01-24 NOTE — ED ADULT NURSE NOTE - NSIMPLEMENTINTERV_GEN_ALL_ED
Implemented All Universal Safety Interventions:  [place] to call system. Call bell, personal items and telephone within reach. Instruct patient to call for assistance. Room bathroom lighting operational. Non-slip footwear when patient is off stretcher. Physically safe environment: no spills, clutter or unnecessary equipment. Stretcher in lowest position, wheels locked, appropriate side rails in place.

## 2021-01-24 NOTE — ED PROVIDER NOTE - PROGRESS NOTE DETAILS
CT results discussed with patient. has been seen and evaluated by Dr. Jones. will admit. abx will be ordered by surgery PA

## 2021-01-24 NOTE — ED PROVIDER NOTE - CARDIOVASCULAR NEGATIVE STATEMENT, MLM
----- Message from Mya Sheehan sent at 4/26/2019 10:35 AM CDT -----  Contact: Joel [phone]  Type:  Patient Returning Call    Who Called: Joel    Who Left Message for Patient: [place]    Would the patient rather a call back or a response via [facility]?  Call back    Best Call Back Number: Joel [phone]    Additional Information: Joel is returning a missed call.  
Spoke with dad and informed that the cough assist and the vest will come from [employer] and that we sent the other equipment over to [facility]. Will call to check and make sure they received everything. Dad stated that they heard from [employer] but not [facility]. Advised to call back with any further questions. He verbalized understanding.  
no chest pain

## 2021-01-24 NOTE — H&P ADULT - NSHPLABSRESULTS_GEN_ALL_CORE
Data:  T(C): 37.6 (21 @ 13:15), Max: 37.6 (21 @ 13:15)  HR: 94 (21 @ 13:15) (94 - 94)  BP: 158/95 (21 @ 13:15) (158/95 - 158/95)  RR: 16 (21 @ 13:15) (16 - 16)  SpO2: 98% (21 @ 13:15) (98% - 98%)                        15.3   9.26  )-----------( 210      ( 2021 14:33 )             44.8         139  |  103  |  16  ----------------------------<  117<H>  3.5   |  32<H>  |  1.30    Ca    8.6      2021 14:33    TPro  7.3  /  Alb  3.8  /  TBili  1.2  /  DBili  x   /  AST  16  /  ALT  38  /  AlkPhos  61        LIVER FUNCTIONS - ( 2021 14:33 )  Alb: 3.8 g/dL / Pro: 7.3 g/dL / ALK PHOS: 61 U/L / ALT: 38 U/L / AST: 16 U/L / GGT: x           Urinalysis Basic - ( 2021 16:22 )    Color: Yellow / Appearance: Clear / S.010 / pH: x  Gluc: x / Ketone: Negative  / Bili: Negative / Urobili: Negative   Blood: x / Protein: 15 / Nitrite: Negative   Leuk Esterase: Negative / RBC: 0-2 /HPF / WBC 0-2   Sq Epi: x / Non Sq Epi: x / Bacteria: x    Radiology:  < from: CT Abdomen and Pelvis w/ IV Cont (21 @ 16:09) >    FINDINGS:  LOWER CHEST: Within normal limits.    LIVER: Within normal limits.  BILE DUCTS: Normal caliber.  GALLBLADDER: Within normal limits.  SPLEEN: Within normal limits.  PANCREAS: Within normal limits.  ADRENALS: Within normal limits.  KIDNEYS/URETERS: Within normal limits.    BLADDER: Within normal limits.  REPRODUCTIVE ORGANS: Prostate within normal limits.    BOWEL: No bowel obstruction. The appendiceal wall is thickened and demonstrates contrast enhancement. There are periappendiceal inflammatory changes. Distention of the appendix noted as well. Findings consistent with acute appendicitis. No evidence of adjacent abscess.  PERITONEUM: No ascites.  VESSELS: Within normal limits.  RETROPERITONEUM/LYMPH NODES: No lymphadenopathy.  ABDOMINAL WALL: Within normal limits.  BONES: Within normal limits.    IMPRESSION:  Acute appendicitis.    JAMMIE KOHLI MD; Attending Radiologist  This document has been electronically signed. 2021  4:17PM    < end of copied text >

## 2021-01-24 NOTE — H&P ADULT - HISTORY OF PRESENT ILLNESS
This is a 35y year old Male PMHx of HTN, HLD presenting with complaint of lower abdominal pain since yesterday AM. Pt states pain started in mid abdomen and localized to RLQ, described as constant, sore type pain. Made worse with movement. Associated anorexia, nausea, chills. No prior abdominal surgeries. Denies history of chest pain, shortness of breath, fevers, chills, vomiting or diarrhea.

## 2021-01-24 NOTE — ED PROVIDER NOTE - OBJECTIVE STATEMENT
35 year old male with history of HTN and HLD presents with abd pain that started yesterday morning. pain was diffuse yesterday, localized to RLQ today. +nausea, no vomiting. denies diarrhea. no fevers. mild chills yesterday, none today. +loss of appetite. pain worse with movement and walking, improves when lying still. no urinary complaints. took gas x a couple hours ago without much improvement of pain. no previous abd surgeries   PCP Patel Jarrell

## 2021-01-24 NOTE — ED PROVIDER NOTE - ATTENDING CONTRIBUTION TO CARE
35 year old male with history of HTN and HLD presents with abd pain that started yesterday morning. pain was diffuse yesterday, worse in midabdomen, localized to RLQ today. +nausea, no vomiting. denies diarrhea, denies testicular pain. no fevers. mild chills yesterday, none today. +loss of appetite, but did eat breakfast today. pain worse with movement and walking, improves when lying still. no urinary complaints. took gas x a couple hours ago without much improvement of pain. no previous abd surgeries   PCP Patel Jarrell  on exam + rlq tenderness  ro appy,   pt seen by dr dietz as well, will follow CT

## 2021-01-24 NOTE — H&P ADULT - ASSESSMENT
Assessment:     This is a 35y year old Male presenting with acute appendicitis.    Plan:  - Seen with Dr. Jones  - acute appendicitis, to OR for lap appy at 8pm  - awaiting COVID PCR results at approx 720  - likely d/c home this evening  -NPO, IVF, supportive care  -IV antibiotics started    Surgical Team Contact Information  Spectralink: Ext: [phone] or [phone]  Pager: [phone]

## 2021-01-24 NOTE — ED ADULT NURSE NOTE - NS ED NURSE DISCH DISPOSITION
[facility] Daily Progress Note    Patient: Mely Longo Date: 2021   : 1945 Attending: Casey Nayak MD   Admit Date: 2021 Room/Bed: [place] [address]   75 year old female      S/p: RE-DO RIGHT FRONTAL CRANIOTOMY WITH WASHOUT OF WOUND Infection, Removal of titanium mesh and Synthes hardware    Chief Complaint: R frontal incisional pain, pain controlled. Drain in place.     Subjective: Pt seen and examined overnight.  Pt is resting comfortably in bed.  No acute overnight events.  She is c/o R sided incisional head pain. Pt denies vision changes, dizziness, denies SOB, CP, HA, new pain or weakness, parethesia.      Vital Last Value 24 Hour Range   Temperature 97.9 °F (36.6 °C) (21) Temp  Min: 97.9 °F (36.6 °C)  Max: 98.2 °F (36.8 °C)   Pulse 79 (21) Pulse  Min: 78  Max: 94   Respiratory 17 (21) Resp  Min: 12  Max: 23   Non-Invasive  Blood Pressure 112/51 (21) BP  Min: 105/79  Max: 146/121   Arterial   Blood Pressure   No data recorded   Pulse Oximetry 96 % (21) SpO2  Min: 86 %  Max: 99 %   CVP   No data recorded   PCWP   No data recorded   Tube Feeding Formula   NA   Tube Feeding Rate   No data recorded   NIH Scale   NA   Glascow Coma Scale 15 NA       Vital Today Admitted   Weight 71.1 kg (156 lb 12 oz) (21 0500) Weight: 67.1 kg (147 lb 15.9 oz) (21)   Height N/A Height: 5' 5\" (165.1 cm) (21)   BMI N/A BMI (Calculated): 24.63 (21)     Peripheral IV 21 Left Antecubital 20 (Active)   Site Assessment WDL 21   Dressing Type Transparent 21 0000   Line Status Blood return noted 21   Interventions Capped IV 21       Peripheral IV 21 Left Forearm 20 (Active)   Site Assessment WDL 21 0000   Dressing Type Transparent 21 0000   Line Status Infusing 21 0000       Drain 21 Head (Active)   Site Assessment No adverse signs 21 0000    prophylaxis.   Continue SG drain to suction; monitor and record q8h.   SBP < 140  HOB > 30 degrees  Dressing to be removed POD#2  SCDs while in bed  Encouraged IS use  Activity/Out of bed to chair with meals  PT/OT  Heme/Onc consulted appreciate recommendations.   ID consulted: appreciate recommendations.  Cefepime and Vanc.  No steroids.   Lovenox 40mg daily  Discharge planning  Anticipate d/c to home once drain is removed and IV antibiotic plan established   1/21 Discussed with Dr. Rios about IV antibiotic coverage and hyperbaric treatment of incision. Discussed with  as well.   Med recs appreciated    Dispo:CCU    Will discuss plan with neurosurgery team today on rounds.   [facility] or call  with questions     Quality:  VTE Pharmacologic Prophylaxis: Yes  VTE Mechanical Prophylaxis: Yes    Barbie Perez PA-C  1/22/2021    Addendum:  This patient was seen and examined on rounds today by the attending neurosurgeon Dr. Nayak, who approved the treatment plan as outlined.  Discontinue RADHA drain; see separate procedure note  Observed ambulating in room with RN  SBP<160  Dressing removed  Exam confirmed  Culture positive for pseudomonas aeruginosa; abx per ID  Okay to transfer to floor with q 4h neurochecks  PT/OT - Progress activity to baseline  Discharge planning  See full plan above  [facility] or call  with questions     Ever Moseley PA-C  1/22/2021         Admitted

## 2021-01-24 NOTE — ED ADULT NURSE NOTE - CARDIO ASSESSMENT
Addended by: VARINDER BARRERA on: 6/23/2020 11:09 AM     Modules accepted: Level of Service, SmartSet     ---

## 2021-01-24 NOTE — ED PROVIDER NOTE - CLINICAL SUMMARY MEDICAL DECISION MAKING FREE TEXT BOX
RLQ pain since yesterday. differential include but not limited to appendicitis, diverticulitis, colitis, SBO, renal colic, viral illness. will check labs, CT abdomen/pelvis. declines pain or nausea meds

## 2021-01-24 NOTE — H&P ADULT - NSHPPHYSICALEXAM_GEN_ALL_CORE
PHYSICAL EXAM:  GENERAL: No acute distress, well-developed  HEAD:  Atraumatic, Normocephalic  CHEST/LUNG: CTAB; No wheezes, rales, or rhonchi  HEART: Regular rate and rhythm; No murmurs, rubs, or gallops  ABDOMEN: Soft, mildly-tender LLQ, mildly-distended; bowel sounds+  NEUROLOGY: A&O x 3, no focal deficits

## 2021-01-24 NOTE — ASU DISCHARGE PLAN (ADULT/PEDIATRIC) - CARE PROVIDER_API CALL
Dangelo Jones  SURGERY  [address], [address]  [place] [address]  Phone: [phone]  Fax: [phone]  Follow Up Time: 1 week

## 2021-01-24 NOTE — H&P ADULT - NSHPREVIEWOFSYSTEMS_GEN_ALL_CORE
Constitutional: Denies fever, fatigue or weight loss.  Skin: Denies rash.  Eyes: Denies recent vision problems or eye pain.  ENT: Denies congestion, ear pain, or sore throat.  Endocrine: Denies thyroid problems.  Cardiovascular: Denies chest pain or palpation.  Respiratory: Denies cough, shortness of breath, congestion, or wheezing.  Gastrointestinal: +abdominal pain, nausea, anorexia. Denies vomiting, or diarrhea.  Genitourinary: Denies dysuria.  Musculoskeletal: Denies joint swelling.  Neurologic: Denies headache.

## 2021-03-22 ENCOUNTER — RX RENEWAL (OUTPATIENT)
Age: 36
End: 2021-03-22

## 2021-04-09 ENCOUNTER — RX RENEWAL (OUTPATIENT)
Age: 36
End: 2021-04-09

## 2021-04-20 PROBLEM — E78.00 PURE HYPERCHOLESTEROLEMIA, UNSPECIFIED: Chronic | Status: ACTIVE | Noted: 2021-01-24

## 2021-04-20 PROBLEM — I10 ESSENTIAL (PRIMARY) HYPERTENSION: Chronic | Status: ACTIVE | Noted: 2021-01-24

## 2021-04-26 ENCOUNTER — TRANSCRIPTION ENCOUNTER (OUTPATIENT)
Age: 36
End: 2021-04-26

## 2021-04-29 ENCOUNTER — NON-APPOINTMENT (OUTPATIENT)
Age: 36
End: 2021-04-29

## 2021-04-29 ENCOUNTER — APPOINTMENT (OUTPATIENT)
Dept: INTERNAL MEDICINE | Facility: CLINIC | Age: 36
End: 2021-04-29
Payer: COMMERCIAL

## 2021-04-29 VITALS — SYSTOLIC BLOOD PRESSURE: 120 MMHG | DIASTOLIC BLOOD PRESSURE: 70 MMHG

## 2021-04-29 VITALS
WEIGHT: 220.31 LBS | OXYGEN SATURATION: 98 % | HEIGHT: 69 IN | BODY MASS INDEX: 32.63 KG/M2 | HEART RATE: 75 BPM | TEMPERATURE: 98 F

## 2021-04-29 DIAGNOSIS — N52.9 MALE ERECTILE DYSFUNCTION, UNSPECIFIED: ICD-10-CM

## 2021-04-29 PROCEDURE — 36415 COLL VENOUS BLD VENIPUNCTURE: CPT

## 2021-04-29 PROCEDURE — 99395 PREV VISIT EST AGE 18-39: CPT | Mod: 25

## 2021-04-29 PROCEDURE — 99072 ADDL SUPL MATRL&STAF TM PHE: CPT

## 2021-04-29 PROCEDURE — 93000 ELECTROCARDIOGRAM COMPLETE: CPT

## 2021-04-29 NOTE — HISTORY OF PRESENT ILLNESS
[FreeTextEntry1] : cpx [de-identified] : got mild  covid 3 wks after 2nd vaccine (PFizer)\par appendectomy late jan\par former 10-15 py tobacco d/c 2012\par occ ED

## 2021-04-29 NOTE — ASSESSMENT
[FreeTextEntry1] : check labs/ecg\par 6 months\par trial off dyazide and monitor home bps\par ?repeat renal artery doppler?

## 2021-04-30 LAB
ALBUMIN SERPL ELPH-MCNC: 5.4 G/DL
ALP BLD-CCNC: 68 U/L
ALT SERPL-CCNC: 35 U/L
ANION GAP SERPL CALC-SCNC: 17 MMOL/L
APPEARANCE: CLEAR
AST SERPL-CCNC: 26 U/L
BACTERIA: NEGATIVE
BASOPHILS # BLD AUTO: 0.07 K/UL
BASOPHILS NFR BLD AUTO: 0.8 %
BILIRUB SERPL-MCNC: 1.1 MG/DL
BILIRUBIN URINE: NEGATIVE
BLOOD URINE: NEGATIVE
BUN SERPL-MCNC: 29 MG/DL
CALCIUM SERPL-MCNC: 10.2 MG/DL
CHLORIDE SERPL-SCNC: 97 MMOL/L
CHOLEST SERPL-MCNC: 182 MG/DL
CO2 SERPL-SCNC: 25 MMOL/L
COLOR: NORMAL
CREAT SERPL-MCNC: 1.16 MG/DL
EOSINOPHIL # BLD AUTO: 0.1 K/UL
EOSINOPHIL NFR BLD AUTO: 1.1 %
GLUCOSE QUALITATIVE U: NEGATIVE
GLUCOSE SERPL-MCNC: 103 MG/DL
HCT VFR BLD CALC: 49.5 %
HDLC SERPL-MCNC: 37 MG/DL
HGB BLD-MCNC: 16.4 G/DL
HYALINE CASTS: 0 /LPF
IMM GRANULOCYTES NFR BLD AUTO: 0.3 %
KETONES URINE: NORMAL
LDLC SERPL CALC-MCNC: 120 MG/DL
LEUKOCYTE ESTERASE URINE: NEGATIVE
LYMPHOCYTES # BLD AUTO: 2.14 K/UL
LYMPHOCYTES NFR BLD AUTO: 24.4 %
MAN DIFF?: NORMAL
MCHC RBC-ENTMCNC: 29.4 PG
MCHC RBC-ENTMCNC: 33.1 GM/DL
MCV RBC AUTO: 88.7 FL
MICROSCOPIC-UA: NORMAL
MONOCYTES # BLD AUTO: 1.07 K/UL
MONOCYTES NFR BLD AUTO: 12.2 %
NEUTROPHILS # BLD AUTO: 5.35 K/UL
NEUTROPHILS NFR BLD AUTO: 61.2 %
NITRITE URINE: NEGATIVE
NONHDLC SERPL-MCNC: 144 MG/DL
PH URINE: 6.5
PLATELET # BLD AUTO: 247 K/UL
POTASSIUM SERPL-SCNC: 4.2 MMOL/L
PROT SERPL-MCNC: 7.8 G/DL
PROTEIN URINE: NEGATIVE
RBC # BLD: 5.58 M/UL
RBC # FLD: 13.4 %
RED BLOOD CELLS URINE: 1 /HPF
SODIUM SERPL-SCNC: 139 MMOL/L
SPECIFIC GRAVITY URINE: 1.02
SQUAMOUS EPITHELIAL CELLS: 0 /HPF
TRIGL SERPL-MCNC: 124 MG/DL
TSH SERPL-ACNC: 1.29 UIU/ML
UROBILINOGEN URINE: NORMAL
WBC # FLD AUTO: 8.76 K/UL
WHITE BLOOD CELLS URINE: 0 /HPF

## 2021-05-03 ENCOUNTER — TRANSCRIPTION ENCOUNTER (OUTPATIENT)
Age: 36
End: 2021-05-03

## 2021-05-05 ENCOUNTER — TRANSCRIPTION ENCOUNTER (OUTPATIENT)
Age: 36
End: 2021-05-05

## 2021-12-21 ENCOUNTER — RX RENEWAL (OUTPATIENT)
Age: 36
End: 2021-12-21

## 2022-02-25 ENCOUNTER — RX RENEWAL (OUTPATIENT)
Age: 37
End: 2022-02-25

## 2022-03-18 NOTE — ED ADULT TRIAGE NOTE - PATIENT ON (OXYGEN DELIVERY METHOD)
Assessment:  Lymphoid aggregates are part of the normal colonic mucosa. Prominent aggregates may cause a bump in the overlying mucosa and resemble a neoplastic polyp; however, they have no malignant potential.      Recommendation: Based on her family history, repeat screening colonoscopy in 5 years (2027). room air

## 2022-03-30 ENCOUNTER — TRANSCRIPTION ENCOUNTER (OUTPATIENT)
Age: 37
End: 2022-03-30

## 2022-05-11 ENCOUNTER — APPOINTMENT (OUTPATIENT)
Dept: INTERNAL MEDICINE | Facility: CLINIC | Age: 37
End: 2022-05-11
Payer: COMMERCIAL

## 2022-05-11 VITALS — SYSTOLIC BLOOD PRESSURE: 128 MMHG | DIASTOLIC BLOOD PRESSURE: 76 MMHG

## 2022-05-11 VITALS
WEIGHT: 232 LBS | HEIGHT: 69 IN | OXYGEN SATURATION: 96 % | BODY MASS INDEX: 34.36 KG/M2 | HEART RATE: 85 BPM | TEMPERATURE: 98.4 F

## 2022-05-11 PROCEDURE — 36415 COLL VENOUS BLD VENIPUNCTURE: CPT

## 2022-05-11 PROCEDURE — 99395 PREV VISIT EST AGE 18-39: CPT | Mod: 25

## 2022-05-11 NOTE — ASSESSMENT
[FreeTextEntry1] : check labs\par consider trial off statin\par wt loss\par advised to observe sleep apnea symps for possible re-eval

## 2022-05-12 LAB
ALBUMIN SERPL ELPH-MCNC: 4.9 G/DL
ALP BLD-CCNC: 81 U/L
ALT SERPL-CCNC: 45 U/L
ANION GAP SERPL CALC-SCNC: 13 MMOL/L
APPEARANCE: CLEAR
AST SERPL-CCNC: 24 U/L
BACTERIA: NEGATIVE
BASOPHILS # BLD AUTO: 0.08 K/UL
BASOPHILS NFR BLD AUTO: 0.9 %
BILIRUB SERPL-MCNC: 1 MG/DL
BILIRUBIN URINE: NEGATIVE
BLOOD URINE: NEGATIVE
BUN SERPL-MCNC: 16 MG/DL
CALCIUM SERPL-MCNC: 9.9 MG/DL
CHLORIDE SERPL-SCNC: 100 MMOL/L
CHOLEST SERPL-MCNC: 203 MG/DL
CO2 SERPL-SCNC: 28 MMOL/L
COLOR: YELLOW
CREAT SERPL-MCNC: 1.1 MG/DL
EGFR: 88 ML/MIN/1.73M2
EOSINOPHIL # BLD AUTO: 0 K/UL
EOSINOPHIL NFR BLD AUTO: 0 %
ESTIMATED AVERAGE GLUCOSE: 114 MG/DL
GLUCOSE QUALITATIVE U: NEGATIVE
GLUCOSE SERPL-MCNC: 96 MG/DL
HBA1C MFR BLD HPLC: 5.6 %
HCT VFR BLD CALC: 49 %
HDLC SERPL-MCNC: 36 MG/DL
HGB BLD-MCNC: 15.7 G/DL
HYALINE CASTS: 1 /LPF
IMM GRANULOCYTES NFR BLD AUTO: 0.3 %
KETONES URINE: NEGATIVE
LDLC SERPL CALC-MCNC: 119 MG/DL
LEUKOCYTE ESTERASE URINE: NEGATIVE
LYMPHOCYTES # BLD AUTO: 3.02 K/UL
LYMPHOCYTES NFR BLD AUTO: 34.2 %
MAN DIFF?: NORMAL
MCHC RBC-ENTMCNC: 28.9 PG
MCHC RBC-ENTMCNC: 32 GM/DL
MCV RBC AUTO: 90.1 FL
MICROSCOPIC-UA: NORMAL
MONOCYTES # BLD AUTO: 0.86 K/UL
MONOCYTES NFR BLD AUTO: 9.7 %
NEUTROPHILS # BLD AUTO: 4.85 K/UL
NEUTROPHILS NFR BLD AUTO: 54.9 %
NITRITE URINE: NEGATIVE
NONHDLC SERPL-MCNC: 167 MG/DL
PH URINE: 6.5
PLATELET # BLD AUTO: 202 K/UL
POTASSIUM SERPL-SCNC: 4.7 MMOL/L
PROT SERPL-MCNC: 7.2 G/DL
PROTEIN URINE: NORMAL
RBC # BLD: 5.44 M/UL
RBC # FLD: 13.6 %
RED BLOOD CELLS URINE: 1 /HPF
SODIUM SERPL-SCNC: 140 MMOL/L
SPECIFIC GRAVITY URINE: 1.03
SQUAMOUS EPITHELIAL CELLS: 0 /HPF
TRIGL SERPL-MCNC: 243 MG/DL
TSH SERPL-ACNC: 1.91 UIU/ML
UROBILINOGEN URINE: NORMAL
WBC # FLD AUTO: 8.84 K/UL
WHITE BLOOD CELLS URINE: 0 /HPF

## 2022-07-11 ENCOUNTER — TRANSCRIPTION ENCOUNTER (OUTPATIENT)
Age: 37
End: 2022-07-11

## 2022-07-25 ENCOUNTER — TRANSCRIPTION ENCOUNTER (OUTPATIENT)
Age: 37
End: 2022-07-25

## 2022-08-17 ENCOUNTER — NON-APPOINTMENT (OUTPATIENT)
Age: 37
End: 2022-08-17

## 2022-10-17 ENCOUNTER — RX RENEWAL (OUTPATIENT)
Age: 37
End: 2022-10-17

## 2022-11-14 ENCOUNTER — RX RENEWAL (OUTPATIENT)
Age: 37
End: 2022-11-14

## 2022-11-23 ENCOUNTER — APPOINTMENT (OUTPATIENT)
Dept: INTERNAL MEDICINE | Facility: CLINIC | Age: 37
End: 2022-11-23

## 2022-11-23 VITALS
HEIGHT: 69 IN | TEMPERATURE: 98.3 F | BODY MASS INDEX: 35.31 KG/M2 | OXYGEN SATURATION: 97 % | WEIGHT: 238.38 LBS | HEART RATE: 95 BPM

## 2022-11-23 VITALS — DIASTOLIC BLOOD PRESSURE: 68 MMHG | SYSTOLIC BLOOD PRESSURE: 122 MMHG

## 2022-11-23 PROCEDURE — 99213 OFFICE O/P EST LOW 20 MIN: CPT | Mod: 25

## 2022-11-23 PROCEDURE — 36415 COLL VENOUS BLD VENIPUNCTURE: CPT

## 2022-11-23 NOTE — ASSESSMENT
[FreeTextEntry1] : wt loss discussed and critical\par lipids--consider increase statin to 10mg\par renew norvasc 10mg\par  Universal Safety Interventions

## 2022-11-23 NOTE — HISTORY OF PRESENT ILLNESS
[FreeTextEntry1] : f/u bp and lipids [de-identified] : no recent home bps\par still on norvasc 10, crestor 5mg\par wt gain noted

## 2022-11-25 ENCOUNTER — TRANSCRIPTION ENCOUNTER (OUTPATIENT)
Age: 37
End: 2022-11-25

## 2022-11-25 LAB
CHOLEST SERPL-MCNC: 206 MG/DL
HDLC SERPL-MCNC: 35 MG/DL
LDLC SERPL CALC-MCNC: 119 MG/DL
NONHDLC SERPL-MCNC: 171 MG/DL
TRIGL SERPL-MCNC: 260 MG/DL

## 2022-11-28 ENCOUNTER — NON-APPOINTMENT (OUTPATIENT)
Age: 37
End: 2022-11-28

## 2022-11-28 ENCOUNTER — TRANSCRIPTION ENCOUNTER (OUTPATIENT)
Age: 37
End: 2022-11-28

## 2022-12-12 ENCOUNTER — RX RENEWAL (OUTPATIENT)
Age: 37
End: 2022-12-12

## 2022-12-13 ENCOUNTER — APPOINTMENT (OUTPATIENT)
Dept: ORTHOPEDIC SURGERY | Facility: CLINIC | Age: 37
End: 2022-12-13

## 2022-12-13 VITALS
HEART RATE: 106 BPM | BODY MASS INDEX: 34.96 KG/M2 | WEIGHT: 236 LBS | HEIGHT: 69 IN | SYSTOLIC BLOOD PRESSURE: 158 MMHG | DIASTOLIC BLOOD PRESSURE: 85 MMHG

## 2022-12-13 DIAGNOSIS — S86.819A STRAIN OF OTHER MUSCLE(S) AND TENDON(S) AT LOWER LEG LEVEL, UNSPECIFIED LEG, INITIAL ENCOUNTER: ICD-10-CM

## 2022-12-13 PROCEDURE — 99203 OFFICE O/P NEW LOW 30 MIN: CPT

## 2022-12-13 PROCEDURE — 73590 X-RAY EXAM OF LOWER LEG: CPT | Mod: LT

## 2022-12-13 NOTE — HISTORY OF PRESENT ILLNESS
[Pain Location] : pain [] : left calf [Worsening] : worsening [Constant] : ~He/She~ states the symptoms seem to be constant [Direct Pressure] : worsened by direct pressure [Walking] : worsened by walking [de-identified] : 12/13/2022: Bin is a pleasant 37-year-old  who works for [employer] who presents to the office today complaining of a left calf injury.  The patient states he was at work going up a couple of steps yesterday when he felt a pop in the back of his leg.  He states he felt like he was hit with a "metal ball."  He has never had an injury to this leg before.  He is able to bear weight on the leg.  He has noticed some swelling but no bruising in the back or leg.  He has never had treatment for this before.  The patient denies any fevers, chills, sweats, recent illnesses, numbness, tingling, weakness, or pain elsewhere at this time.

## 2022-12-13 NOTE — DISCUSSION/SUMMARY
[de-identified] : Assessment: 37-year-old male with a left calf strain\par \par Plan: I had a long discussion with the patient today regarding the nature of their diagnosis and treatment plan. We discussed the risks and benefits of no treatment as well as nonoperative and operative treatments.  I reviewed the patient's x-rays today with him in the office is negative for any acute pathology.  On examination he has tenderness isolated to the left gastrocnemius muscle consistent with a strain.  At this time I recommend conservative treatment of the patient's condition with modalities including rest, ice, heat, anti-inflammatory medications, activity modifications, and home stretching and strengthening exercises daily. I discussed with the patient the risks and benefits associated with NSAID use.  A referral for physical therapy was provided today to begin working on exercises for the calf to help improve his pain and function.  Recommend follow-up in 6 to 8 weeks for repeat assessment.  If the patient continues to have pain we will obtain an MRI.\par \par The patient verbalizes their understanding and agrees with the plan.  All questions were answered to their satisfaction.

## 2022-12-13 NOTE — PHYSICAL EXAM
[de-identified] : General:\par Awake, alert, no acute distress, Patient was cooperative and appropriate during the examination.\par \par The patient's weight is normal for height and age.\par \par Ambulates with an antalgic gait on his left side.\par \par Full, painless range of motion of the neck and back.\par \par Exam of the bilateral lower extremities is intact and symmetric with regards to dermatologic, vascular, and neurologic exam. Bilateral lower extremity sensation is grossly intact to light touch in the DP/SP/T/S/S nerve distributions. Intact DF/PF/EHL. BIlateral lower extremity warm and well-perfused with brisk capillary refill.\par \par Pulmonary:\par Regular, nonlabored breathing\par \par Abdomen:\par Soft, nontender, nondistended.\par \par Lymphatic:\par No evidence of inguinal lymphadenopathy\par \par \par \par Left lower extremity examination:\par Physical examination of the lower extremity demonstrates normal skin.  There is no ecchymosis.  There is mild soft tissue swelling about the calf.  There is no erythema, warmth, or joint effusion\par \par Sensation is intact to light touch L2-S1\par Palpable DP/PT pulse\par EHL/FHL/TA/GSC motor function intact\par \par Ankle Range of Motion:\par Dorsiflexion 20 degrees\par Plantarflexion 40 degrees\par Inversion 30 degrees\par Eversion 20 degrees\par \par Knee range of motion: \par 0 to 130 degrees\par \par Strength Testing\par Dorsiflexion 5/5\par Plantarflexion 5/5\par Inversion 5/5\par Eversion 5/5\par \par Palpation\par Not tender to palpation over the lateral malleolus\par Not tender to palpation over the medial malleolus\par Not tender to palpation over the ATFL, CFL, PTFL\par Not tender to palpation over the calcaneal tuberosity\par Not tender to palpation over the peroneal tendons\par Not tender to palpation over the tarsals, metatarsals, or phalanges\par No palpable defect within the achilles tendon\par Moderately tender to palpation about the medial head of the gastrocnemius proximal to the myotendinous junction\par \par Special Tests:\par Ankle anterior drawer negative\par Squeeze test negative\par Reagan's test negative [de-identified] : X-rays including multiple views of the left tibia and fibula were obtained in the office on 12/13/2022 and reviewed the patient.  There is no acute fracture or dislocation.  There is no significant arthritis.

## 2022-12-17 ENCOUNTER — EMERGENCY (EMERGENCY)
Facility: HOSPITAL | Age: 37
LOS: 1 days | Discharge: HOME | End: 2022-12-17
Attending: EMERGENCY MEDICINE | Admitting: EMERGENCY MEDICINE
Payer: COMMERCIAL

## 2022-12-17 VITALS
SYSTOLIC BLOOD PRESSURE: 138 MMHG | OXYGEN SATURATION: 100 % | RESPIRATION RATE: 18 BRPM | TEMPERATURE: 98 F | HEART RATE: 79 BPM | DIASTOLIC BLOOD PRESSURE: 88 MMHG | HEIGHT: 69 IN | WEIGHT: 235.89 LBS

## 2022-12-17 PROCEDURE — 99283 EMERGENCY DEPT VISIT LOW MDM: CPT | Mod: 25

## 2022-12-17 PROCEDURE — 73630 X-RAY EXAM OF FOOT: CPT | Mod: 26,LT

## 2022-12-17 PROCEDURE — 99283 EMERGENCY DEPT VISIT LOW MDM: CPT

## 2022-12-17 PROCEDURE — 73630 X-RAY EXAM OF FOOT: CPT

## 2022-12-17 RX ORDER — IBUPROFEN 200 MG
600 TABLET ORAL ONCE
Refills: 0 | Status: COMPLETED | OUTPATIENT
Start: 2022-12-17 | End: 2022-12-17

## 2022-12-17 RX ORDER — TRIAMTERENE/HYDROCHLOROTHIAZID 75 MG-50MG
1 TABLET ORAL
Qty: 0 | Refills: 0 | DISCHARGE

## 2022-12-17 RX ORDER — ROSUVASTATIN CALCIUM 5 MG/1
1 TABLET ORAL
Qty: 0 | Refills: 0 | DISCHARGE

## 2022-12-17 RX ORDER — AMLODIPINE BESYLATE 2.5 MG/1
1 TABLET ORAL
Qty: 0 | Refills: 0 | DISCHARGE

## 2022-12-17 RX ADMIN — Medication 600 MILLIGRAM(S): at 13:57

## 2022-12-17 RX ADMIN — Medication 50 MILLIGRAM(S): at 14:34

## 2022-12-17 NOTE — ED PROVIDER NOTE - PROGRESS NOTE DETAILS
Dr. Monique advised dc with prednisone rx then NSAID use for potential gout.  All questions were answered. Discussed the importance of prompt, close medical follow-up. Patient will return with any changes, concerns or persistent/worsening symptoms.  Patient verbalized understanding.

## 2022-12-17 NOTE — ED ADULT TRIAGE NOTE - CHIEF COMPLAINT QUOTE
Patient strained his left calf muscles last week, now c.o left foot pain. Unable to apply pressure to foot.

## 2022-12-17 NOTE — ED PROVIDER NOTE - NSFOLLOWUPINSTRUCTIONS_ED_ALL_ED_FT
1) Follow-up with Podiatry, See referred doctor. Call today/next business day for close, prompt follow-up.  2) Return to Emergency room for any worsening or persistent pain, weakness, numbness, fever, color change to extremity, or any other concerning symptoms.  3) Take the prescribed steroids then use NSAIDs for pain. Consider use of Tylenol meanwhile.   4) You can consider discussing with your doctor if physical therapy or further imaging as an MRI may be beneficial.     Foot Pain      Many things can cause foot pain. Some common causes are:  •An injury.      •A sprain.      •Arthritis.      •Blisters.      •Bunions.        Follow these instructions at home:      Managing pain, stiffness, and swelling   Bag of ice on a towel on the skin.   If directed, put ice on the painful area:  •Put ice in a plastic bag.      •Place a towel between your skin and the bag.      •Leave the ice on for 20 minutes, 2–3 times a day.      Activity     • Do not stand or walk for long periods.      •Return to your normal activities as told by your health care provider. Ask your health care provider what activities are safe for you.      •Do stretches to relieve foot pain and stiffness as told by your health care provider.      • Do not lift anything that is heavier than 10 lb (4.5 kg), or the limit that you are told, until your health care provider says that it is safe. Lifting a lot of weight can put added pressure on your feet.      Lifestyle     •Wear comfortable, supportive shoes that fit you well. Do not wear high heels.      •Keep your feet clean and dry.      General instructions     •Take over-the-counter and prescription medicines only as told by your health care provider.      •Rub your foot gently.      •Pay attention to any changes in your symptoms.      •Keep all follow-up visits as told by your health care provider. This is important.        Contact a health care provider if:    •Your pain does not get better after a few days of self-care.      •Your pain gets worse.      •You cannot stand on your foot.        Get help right away if:    •Your foot is numb or tingling.      •Your foot or toes are swollen.      •Your foot or toes turn white or blue.      •You have warmth and redness along your foot.        Summary    •Common causes of foot pain are injury, sprain, arthritis, blisters, or bunions.      •Ice, medicines, and comfortable shoes may help foot pain.      •Contact your health care provider if your pain does not get better after a few days of self-care.      This information is not intended to replace advice given to you by your health care provider. Make sure you discuss any questions you have with your health care provider.

## 2022-12-17 NOTE — ED ADULT NURSE NOTE - CADM POA CENTRAL LINE
[facility] DIVISION OF KIDNEY DISEASES AND HYPERTENSION -- INITIAL CONSULT NOTE  --------------------------------------------------------------------------------  HPI: 82 year old male with a PMH of atrial fibrillation (on Pradaxa), HTN, Thyroid Goiter, CKD stage III (dx 2016) was seen at [facility] with inferior wall STEMI and was brought to [facility] for cardiac catherization.  In the Cath lab, pt became non-responsive and had a cardiac arrest. In cath lab, pt underwent PCI to the ostial RCA.  He also has a [place], an IAPB, an Implla, and a transvenous pacing wire.  His course was complicated by right groin hematoma and he recieved 1 unit PRBC. Upon presentation the patient had a Scr of 1.7 (Baseline 1.5 in 6/2016 as per records in sunrise) which has now increased to 2.9 with minimal urine output. Nephrology consulted for DANITA and Anuria. Patient has a known hx of CKD stage III as per the wife which was explained to them in 6/2016. He does not see a nephrologist as an outpatient however he does have routine blood-work done with his PCP every 3 months for monitoring. Patient has never had a renal biopsy done in the past for diagnosis of the underlying CKD.       PAST HISTORY  --------------------------------------------------------------------------------  PAST MEDICAL & SURGICAL HISTORY:  Vertigo  Thyroid condition  Afib  High blood pressure  No significant past surgical history    FAMILY HISTORY:  Family history of heart attack (Father, Sibling, Grandparent, Uncle): many family members have had heart problems at young ages    PAST SOCIAL HISTORY:    ALLERGIES & MEDICATIONS  --------------------------------------------------------------------------------  Allergies    No Known Allergies    Intolerances      Standing Inpatient Medications  aspirin  chewable 81 milliGRAM(s) Oral daily  DOBUTamine Infusion 5 MICROgram(s)/kG/Min IV Continuous <Continuous>  fentaNYL   Infusion 1 MICROgram(s)/kG/Hr IV Continuous <Continuous>  heparin  Infusion. 250 Unit(s)/Hr IV Continuous <Continuous>  heparin 34203 Unit(s),dextrose 5% in water 500 milliLiter(s) 03144 Unit(s) IV Continuous <Continuous>  influenza   Vaccine 0.5 milliLiter(s) IntraMuscular once  midazolam Infusion 1 mG/Hr IV Continuous <Continuous>  pantoprazole  Injectable 40 milliGRAM(s) IV Push daily  piperacillin/tazobactam IVPB. 3.375 Gram(s) IV Intermittent every 8 hours  ticagrelor 90 milliGRAM(s) Oral two times a day  vancomycin  IVPB 1250 milliGRAM(s) IV Intermittent every 24 hours  vasopressin Infusion 0.04 Unit(s)/Min IV Continuous <Continuous>    PRN Inpatient Medications  heparin  Injectable 4000 Unit(s) IV Push every 6 hours PRN      REVIEW OF SYSTEMS  --------------------------------------------------------------------------------  Unable to obtain at this time.     VITALS/PHYSICAL EXAM  --------------------------------------------------------------------------------  T(C): 37.6 (10-16-17 @ 08:00), Max: 37.6 (10-16-17 @ 07:00)  HR: 68 (10-16-17 @ 09:00) (31 - 115)  BP: 84/50 (10-15-17 @ 16:06) (75/58 - 98/72)  RR: 19 (10-16-17 @ 09:00) (0 - 25)  SpO2: 98% (10-16-17 @ 09:00) (93% - 100%)  Wt(kg): --  Height (cm): 180.34 (10-15-17 @ 16:30)  Weight (kg): 93.1 (10-15-17 @ 16:30)  BMI (kg/m2): 28.6 (10-15-17 @ 16:30)  BSA (m2): 2.13 (10-15-17 @ 16:30)      10-15-17 @ 07:01  -  10-16-17 @ 07:00  --------------------------------------------------------  IN: 2057.6 mL / OUT: 162 mL / NET: 1895.6 mL      Physical Exam:  	Gen: Intubated  	HEENT: +ET  	Pulm: Course BS B/L  	CV: RRR, S1S2  	Abd: +BS, soft, nontender/nondistended + Gonzales catheter   	LE: Warm, no edema  	Skin: Warm, without rashes      LABS/STUDIES  --------------------------------------------------------------------------------              12.3   16.6  >-----------<  169      [10-16-17 @ 06:38]              37.7     140  |  108  |  46  ----------------------------<  146      [10-16-17 @ 08:06]  5.8   |  17  |  3.04        Ca     8.0     [10-16-17 @ 08:06]      Mg     1.8     [10-16-17 @ 08:06]      Phos  4.4     [10-16-17 @ 08:06]    TPro  5.2  /  Alb  3.0  /  TBili  0.9  /  DBili  x   /  AST  839  /  ALT  543  /  AlkPhos  57  [10-16-17 @ 06:38]    PT/INR: PT 23.3 , INR 2.12       [10-15-17 @ 17:05]  PTT: > 200      [10-16-17 @ 06:38]    Troponin 12.58      [10-16-17 @ 06:38]  CK 4583      [10-16-17 @ 06:38]  LDH 1438      [10-16-17 @ 04:03]    Creatinine Trend:  SCr 3.04 [10-16 @ 08:06]  SCr 2.90 [10-16 @ 06:38]  SCr 2.12 [10-15 @ 20:20]  SCr 2.01 [10-15 @ 17:05]  SCr 1.70 [10-15 @ 12:13]    Urinalysis - [10-15-17 @ 16:48]      Color Yellow / Appearance Turbid / SG >1.030 / pH 6.5      Gluc 50 / Ketone Negative  / Bili Negative / Urobili Negative       Blood Moderate / Protein >600 / Leuk Est Negative / Nitrite Negative      RBC >50 / WBC 3-5 / Hyaline  / Gran  / Sq Epi  / Non Sq Epi OCC / Bacteria Few      HbA1c 5.4      [10-15-17 @ 21:32]  TSH 2.40      [10-15-17 @ 21:34]  Lipid: chol 129, TG 45, HDL 41, LDL 79      [10-15-17 @ 21:40] [facility] DIVISION OF KIDNEY DISEASES AND HYPERTENSION -- INITIAL CONSULT NOTE  --------------------------------------------------------------------------------  HPI: 82 year old male with a PMH of atrial fibrillation (on Pradaxa), HTN, Thyroid Goiter, CKD stage III (dx 2016) was seen at [facility] with inferior wall STEMI and was brought to [facility] for cardiac catherization.  In the Cath lab, pt became non-responsive and had a cardiac arrest. In cath lab, pt underwent PCI to the ostial RCA.  He also has a [place], an IAPB, an Implla, and a transvenous pacing wire.  His course was complicated by right groin hematoma and he recieved 1 unit PRBC. Upon presentation the patient had a Scr of 1.7 (Baseline 1.5 in 6/2016 as per records in sunrise) which has now increased to 2.9 with minimal urine output. Nephrology consulted for DANITA and Anuria. Patient has a known hx of CKD stage III as per the wife which was explained to them in 6/2016. Upon further discussion with the wife, he does not see a nephrologist as an outpatient however he does have routine blood-work done with his PCP every 3 months for monitoring. Patient has never had a renal biopsy done in the past for diagnosis of the underlying CKD. Patient was taking aleve PRN for leg pain but not daily. Patient was recently prescribed trazodone as a sleeping aid. No family hx of renal disease, only heart disease. Patient is currently intubated and history was taken from the wife.       PAST HISTORY  --------------------------------------------------------------------------------  PAST MEDICAL & SURGICAL HISTORY:  Vertigo  Thyroid condition  Afib  High blood pressure  No significant past surgical history    FAMILY HISTORY:  Family history of heart attack (Father, Sibling, Grandparent, Uncle): many family members have had heart problems at young ages    PAST SOCIAL HISTORY:    ALLERGIES & MEDICATIONS  --------------------------------------------------------------------------------  Allergies    No Known Allergies    Intolerances      Standing Inpatient Medications  aspirin  chewable 81 milliGRAM(s) Oral daily  DOBUTamine Infusion 5 MICROgram(s)/kG/Min IV Continuous <Continuous>  fentaNYL   Infusion 1 MICROgram(s)/kG/Hr IV Continuous <Continuous>  heparin  Infusion. 250 Unit(s)/Hr IV Continuous <Continuous>  heparin 42344 Unit(s),dextrose 5% in water 500 milliLiter(s) 00624 Unit(s) IV Continuous <Continuous>  influenza   Vaccine 0.5 milliLiter(s) IntraMuscular once  midazolam Infusion 1 mG/Hr IV Continuous <Continuous>  pantoprazole  Injectable 40 milliGRAM(s) IV Push daily  piperacillin/tazobactam IVPB. 3.375 Gram(s) IV Intermittent every 8 hours  ticagrelor 90 milliGRAM(s) Oral two times a day  vancomycin  IVPB 1250 milliGRAM(s) IV Intermittent every 24 hours  vasopressin Infusion 0.04 Unit(s)/Min IV Continuous <Continuous>    PRN Inpatient Medications  heparin  Injectable 4000 Unit(s) IV Push every 6 hours PRN      REVIEW OF SYSTEMS  --------------------------------------------------------------------------------  Unable to obtain at this time.     VITALS/PHYSICAL EXAM  --------------------------------------------------------------------------------  T(C): 37.6 (10-16-17 @ 08:00), Max: 37.6 (10-16-17 @ 07:00)  HR: 68 (10-16-17 @ 09:00) (31 - 115)  BP: 84/50 (10-15-17 @ 16:06) (75/58 - 98/72)  RR: 19 (10-16-17 @ 09:00) (0 - 25)  SpO2: 98% (10-16-17 @ 09:00) (93% - 100%)  Wt(kg): --  Height (cm): 180.34 (10-15-17 @ 16:30)  Weight (kg): 93.1 (10-15-17 @ 16:30)  BMI (kg/m2): 28.6 (10-15-17 @ 16:30)  BSA (m2): 2.13 (10-15-17 @ 16:30)      10-15-17 @ 07:01  -  10-16-17 @ 07:00  --------------------------------------------------------  IN: 2057.6 mL / OUT: 162 mL / NET: 1895.6 mL      Physical Exam:  	Gen: Intubated  	HEENT: +ET  	Pulm: Course BS B/L  	CV: RRR, S1S2  	Abd: +BS, soft, nontender/nondistended + Gonzales catheter   	LE: Warm, no edema  	Skin: Warm, without rashes      LABS/STUDIES  --------------------------------------------------------------------------------              12.3   16.6  >-----------<  169      [10-16-17 @ 06:38]              37.7     140  |  108  |  46  ----------------------------<  146      [10-16-17 @ 08:06]  5.8   |  17  |  3.04        Ca     8.0     [10-16-17 @ 08:06]      Mg     1.8     [10-16-17 @ 08:06]      Phos  4.4     [10-16-17 @ 08:06]    TPro  5.2  /  Alb  3.0  /  TBili  0.9  /  DBili  x   /  AST  839  /  ALT  543  /  AlkPhos  57  [10-16-17 @ 06:38]    PT/INR: PT 23.3 , INR 2.12       [10-15-17 @ 17:05]  PTT: > 200      [10-16-17 @ 06:38]    Troponin 12.58      [10-16-17 @ 06:38]  CK 4583      [10-16-17 @ 06:38]  LDH 1438      [10-16-17 @ 04:03]    Creatinine Trend:  SCr 3.04 [10-16 @ 08:06]  SCr 2.90 [10-16 @ 06:38]  SCr 2.12 [10-15 @ 20:20]  SCr 2.01 [10-15 @ 17:05]  SCr 1.70 [10-15 @ 12:13]    Urinalysis - [10-15-17 @ 16:48]      Color Yellow / Appearance Turbid / SG >1.030 / pH 6.5      Gluc 50 / Ketone Negative  / Bili Negative / Urobili Negative       Blood Moderate / Protein >600 / Leuk Est Negative / Nitrite Negative      RBC >50 / WBC 3-5 / Hyaline  / Gran  / Sq Epi  / Non Sq Epi OCC / Bacteria Few      HbA1c 5.4      [10-15-17 @ 21:32]  TSH 2.40      [10-15-17 @ 21:34]  Lipid: chol 129, TG 45, HDL 41, LDL 79      [10-15-17 @ 21:40] No

## 2022-12-17 NOTE — ED ADULT NURSE NOTE - NSIMPLEMENTINTERV_GEN_ALL_ED
Implemented All Universal Safety Interventions:  [place] to call system. Call bell, personal items and telephone within reach. Instruct patient to call for assistance. Room bathroom lighting operational. Non-slip footwear when patient is off stretcher. Physically safe environment: no spills, clutter or unnecessary equipment. Stretcher in lowest position, wheels locked, appropriate side rails in place.

## 2022-12-17 NOTE — ED PROVIDER NOTE - ATTENDING APP SHARED VISIT CONTRIBUTION OF CARE
Examination reveals a well-developed well-nourished white male in no acute distress with localized tenderness to even light touch or percussion with overlying redness and slight warmth to lateral foot near the base of fourth metatarsal.  I agree with plan of management outlined by PA.

## 2022-12-17 NOTE — ED PROVIDER NOTE - OBJECTIVE STATEMENT
37-year-old male with past medical history of hypertension presents  today due to left foot pain.  Patient reports he woke up with the pain this morning and was unable to bear weight on the foot.  Patient notes pain at the lateral foot, describes as aching, non-radiating, and currently 7 out of 10.  Patient reports he had left calf injury on Monday while he was going down the steps.  Patient followed up with his doctor in which she had x-rays which were negative.  Patient did not take any meds for pain.  Patient denies numbness, weakness, laceration, pain at the Achilles, calf pain currently, swelling, bruising, or any other complaints.

## 2022-12-17 NOTE — ED PROVIDER NOTE - NS ED ATTENDING STATEMENT MOD
This was a shared visit with the JAMAR. I reviewed and verified the documentation and independently performed the documented:

## 2022-12-17 NOTE — ED PROVIDER NOTE - CLINICAL SUMMARY MEDICAL DECISION MAKING FREE TEXT BOX
Patient is a 37-year-old male presents to the emergency department at [facility] complaining of left foot pain.  Patient states that he went to his orthopedist a few days ago for a left calf injury had x-rays performed which were negative for bony injury was placed in a Ace wrap but today without any trauma woke up this morning with left lateral foot pain with noted slight redness and warmth and exquisite sensitivity to even light touch over affected area.  No trauma as stated.  No fever no chills no nausea no vomiting.

## 2022-12-17 NOTE — ED PROVIDER NOTE - CARE PROVIDER_API CALL
Adeel Arrieta (DPM)  Foot Surgery; Podiatric Medicine; Recon RearfootAnkle Surgery  [address] [place] [address]  Phone: [phone]  Fax: [phone]  Follow Up Time: 1-3 Days

## 2022-12-17 NOTE — ED PROVIDER NOTE - PATIENT PORTAL LINK FT
You can access the FollowMyHealth Patient Portal offered by [facility] by registering at the following website: [email or web address]. By joining [facility]’s FollowMyHealth portal, you will also be able to view your health information using other applications (apps) compatible with our system.

## 2022-12-17 NOTE — ED PROVIDER NOTE - PHYSICAL EXAMINATION
Constitutional: Awake, Alert, non-toxic. NAD. Well appearing, well nourished.   HEAD: Normocephalic, atraumatic.   EYES: EOM intact, conjunctiva and sclera are clear bilaterally.   ENT: No rhinorrhea, patent, mucous membranes pink/moist, no drooling or stridor.   NECK: Supple, non-tender  CARDIOVASCULAR: Normal S1, S2; regular rate and rhythm.  RESPIRATORY: Normal respiratory effort; breath sounds CTAB, no wheezes, rhonchi, or rales. Speaking in full sentences. No accessory muscle use.   ABDOMEN: Soft; non-tender, non-distended.   EXTREMITIES: Full passive and active ROM in all extremities; (+) left 5th MT TTP, no swelling or ecchymosis, ankle and knee non-tender to palpation; negative Aliyah sign, negative Reagan Test, distal pulses palpable and symmetric  SKIN: Warm, dry; good skin turgor, no apparent lesions or rashes, no ecchymosis, brisk capillary refill.  NEURO: A&O x3. Sensory and motor functions are grossly intact. Speech is normal. Appearance and judgement seem appropriate for gender and age.

## 2022-12-17 NOTE — ED ADULT TRIAGE NOTE - AS TEMP SITE
forehead You can access the FollowMyHealth Patient Portal offered by [facility] by registering at the following website: [email or web address]. By joining [employer]’s FollowMyHealth portal, you will also be able to view your health information using other applications (apps) compatible with our system.

## 2022-12-17 NOTE — ED ADULT NURSE NOTE - OBJECTIVE STATEMENT
Pt states that he felt something pop in his left ankle on Monday and developed left foot pain today. Pt in no acute distress. Pt updated on plan of care.

## 2022-12-21 ENCOUNTER — NON-APPOINTMENT (OUTPATIENT)
Age: 37
End: 2022-12-21

## 2022-12-29 ENCOUNTER — APPOINTMENT (OUTPATIENT)
Dept: RHEUMATOLOGY | Facility: CLINIC | Age: 37
End: 2022-12-29
Payer: COMMERCIAL

## 2022-12-29 VITALS
HEIGHT: 69 IN | DIASTOLIC BLOOD PRESSURE: 86 MMHG | OXYGEN SATURATION: 98 % | TEMPERATURE: 97.9 F | HEART RATE: 84 BPM | RESPIRATION RATE: 16 BRPM | WEIGHT: 234.56 LBS | BODY MASS INDEX: 34.74 KG/M2 | SYSTOLIC BLOOD PRESSURE: 135 MMHG

## 2022-12-29 PROCEDURE — 99205 OFFICE O/P NEW HI 60 MIN: CPT | Mod: 25

## 2022-12-29 PROCEDURE — 36415 COLL VENOUS BLD VENIPUNCTURE: CPT

## 2023-01-06 NOTE — HISTORY OF PRESENT ILLNESS
[FreeTextEntry1] : 37 year old male with PMH as listed below presents today for an initial evaluation\par \par Few weeks ago developed Left calf muscle strain. \par He was at work- when he felt a pop in the back of his leg associated with mild swelling. \par He was evaluated by ortho who dx him with left calf strain, treated conservatively. \par \par His calf pain was getting better, however soon after he developed pain/swelling/redness to Left ankle\par He was evaluated at . dx with gout. Chart reviewed. He was treated with prednisone taper with improvement in symptoms\par \par NO recurrence of symptoms. Denies prior similar episodes. \par +kidney stones\par \par denies fever, chills, oral ulcers, nasal congestion, difficulty swallowing,  denies ear pain, hearing changes, denies chest pain, chest palpitations, denies sob, denies nausea, vomiting, abdominal pain, diarrhea, constipation, blood in stool, denies dysuria, blood in the urine, denies rashes, denies blood clots,  raynauds

## 2023-01-06 NOTE — PHYSICAL EXAM
[General Appearance - Alert] : alert [General Appearance - In No Acute Distress] : in no acute distress [General Appearance - Well Nourished] : well nourished [General Appearance - Well Developed] : well developed [Sclera] : the sclera and conjunctiva were normal [Outer Ear] : the ears and nose were normal in appearance [Neck Appearance] : the appearance of the neck was normal [] : no rash [No Focal Deficits] : no focal deficits [Oriented To Time, Place, And Person] : oriented to person, place, and time [Musculoskeletal - Swelling] : no joint swelling seen

## 2023-01-06 NOTE — ASSESSMENT
[FreeTextEntry1] : Monoarthritis, 1 episode. No recurrence\par \par - labs as below. will call pt with results\par - NSAIDS/Tylenol prn for pain\par \par Discussed treatment plan with the patient. The patient was given the opportunity to ask questions and all questions were answered to their satisfaction.

## 2023-01-12 ENCOUNTER — APPOINTMENT (OUTPATIENT)
Dept: RHEUMATOLOGY | Facility: CLINIC | Age: 38
End: 2023-01-12
Payer: COMMERCIAL

## 2023-01-12 LAB
ALBUMIN SERPL ELPH-MCNC: 4.6 G/DL
ALP BLD-CCNC: 85 U/L
ALT SERPL-CCNC: 48 U/L
ANION GAP SERPL CALC-SCNC: 11 MMOL/L
AST SERPL-CCNC: 20 U/L
BASOPHILS # BLD AUTO: 0.06 K/UL
BASOPHILS NFR BLD AUTO: 0.6 %
BILIRUB SERPL-MCNC: 0.7 MG/DL
BUN SERPL-MCNC: 18 MG/DL
CALCIUM SERPL-MCNC: 9.5 MG/DL
CALCIUM SERPL-MCNC: 9.5 MG/DL
CHLORIDE SERPL-SCNC: 102 MMOL/L
CO2 SERPL-SCNC: 27 MMOL/L
CREAT SERPL-MCNC: 1.1 MG/DL
CREAT SPEC-SCNC: 231 MG/DL
CREAT/PROT UR: 0 RATIO
EGFR: 88 ML/MIN/1.73M2
EOSINOPHIL # BLD AUTO: 0.4 K/UL
EOSINOPHIL NFR BLD AUTO: 4.2 %
FERRITIN SERPL-MCNC: 264 NG/ML
FOLATE SERPL-MCNC: 8.1 NG/ML
GLUCOSE SERPL-MCNC: 101 MG/DL
HCT VFR BLD CALC: 47.3 %
HGB BLD-MCNC: 15.8 G/DL
IMM GRANULOCYTES NFR BLD AUTO: 1 %
IRON SATN MFR SERPL: 29 %
IRON SERPL-MCNC: 91 UG/DL
LYMPHOCYTES # BLD AUTO: 2.98 K/UL
LYMPHOCYTES NFR BLD AUTO: 31.5 %
MAGNESIUM SERPL-MCNC: 2.1 MG/DL
MAN DIFF?: NORMAL
MCHC RBC-ENTMCNC: 29 PG
MCHC RBC-ENTMCNC: 33.4 GM/DL
MCV RBC AUTO: 86.8 FL
MONOCYTES # BLD AUTO: 0.75 K/UL
MONOCYTES NFR BLD AUTO: 7.9 %
NEUTROPHILS # BLD AUTO: 5.17 K/UL
NEUTROPHILS NFR BLD AUTO: 54.8 %
PARATHYROID HORMONE INTACT: 61 PG/ML
PHOSPHATE SERPL-MCNC: 3.8 MG/DL
PLATELET # BLD AUTO: 252 K/UL
POTASSIUM SERPL-SCNC: 4.2 MMOL/L
PROT SERPL-MCNC: 7.2 G/DL
PROT UR-MCNC: 10 MG/DL
RBC # BLD: 5.45 M/UL
RBC # FLD: 13.2 %
SODIUM SERPL-SCNC: 140 MMOL/L
TIBC SERPL-MCNC: 315 UG/DL
UIBC SERPL-MCNC: 224 UG/DL
URATE SERPL-MCNC: 7.5 MG/DL
WBC # FLD AUTO: 9.45 K/UL

## 2023-01-12 PROCEDURE — 99213 OFFICE O/P EST LOW 20 MIN: CPT | Mod: 95

## 2023-01-17 RX ORDER — PREDNISONE 10 MG/1
10 TABLET ORAL DAILY
Qty: 30 | Refills: 1 | Status: ACTIVE | COMMUNITY
Start: 2023-01-17 | End: 1900-01-01

## 2023-02-05 NOTE — ASSESSMENT
[FreeTextEntry1] : Monoarthritis, 1 episode. No recurrence\par UA: 7.5\par \par No indication to start ULT at this point of time as he had 1 episode this far. No hx of kidney stones. \par \par - f/u as needed\par - NSAIDS/Tylenol prn for pain\par \par Discussed treatment plan with the patient. The patient was given the opportunity to ask questions and all questions were answered to their satisfaction.

## 2023-02-05 NOTE — HISTORY OF PRESENT ILLNESS
[Other Location: e.g. School (Enter Location, City,State)___] : at [unfilled], at the time of the visit. [Medical Office: ([facility])___] : at the medical office located in  [Verbal consent obtained from patient] : the patient, [unfilled] [FreeTextEntry1] : Pt presenting today for a f/u visit for gout. \par Labs from 12/2022 reviewed with pt. UA: 7.5. No recurrence of gout. \par ROS otherwise unchanged from LV. \par

## 2023-04-04 ENCOUNTER — TRANSCRIPTION ENCOUNTER (OUTPATIENT)
Age: 38
End: 2023-04-04

## 2023-04-05 ENCOUNTER — RX RENEWAL (OUTPATIENT)
Age: 38
End: 2023-04-05

## 2023-05-09 NOTE — PRE-OP CHECKLIST - IV STARTED
yes

## 2023-06-05 ENCOUNTER — NON-APPOINTMENT (OUTPATIENT)
Age: 38
End: 2023-06-05

## 2023-06-05 ENCOUNTER — APPOINTMENT (OUTPATIENT)
Dept: INTERNAL MEDICINE | Facility: CLINIC | Age: 38
End: 2023-06-05
Payer: COMMERCIAL

## 2023-06-05 VITALS
BODY MASS INDEX: 34.74 KG/M2 | SYSTOLIC BLOOD PRESSURE: 139 MMHG | WEIGHT: 234.56 LBS | RESPIRATION RATE: 16 BRPM | TEMPERATURE: 98.2 F | DIASTOLIC BLOOD PRESSURE: 84 MMHG | OXYGEN SATURATION: 96 % | HEIGHT: 69 IN | HEART RATE: 85 BPM

## 2023-06-05 VITALS — DIASTOLIC BLOOD PRESSURE: 74 MMHG | SYSTOLIC BLOOD PRESSURE: 124 MMHG

## 2023-06-05 DIAGNOSIS — Z00.00 ENCOUNTER FOR GENERAL ADULT MEDICAL EXAMINATION W/OUT ABNORMAL FINDINGS: ICD-10-CM

## 2023-06-05 DIAGNOSIS — M10.9 GOUT, UNSPECIFIED: ICD-10-CM

## 2023-06-05 PROCEDURE — 36415 COLL VENOUS BLD VENIPUNCTURE: CPT

## 2023-06-05 PROCEDURE — 93000 ELECTROCARDIOGRAM COMPLETE: CPT

## 2023-06-05 PROCEDURE — 99395 PREV VISIT EST AGE 18-39: CPT | Mod: 25

## 2023-06-05 NOTE — HISTORY OF PRESENT ILLNESS
[FreeTextEntry1] : cpx [de-identified] : pulled his left hamstring--doing local care\par rheum notes reviewed\par d/c tobacco 2012

## 2023-06-06 ENCOUNTER — NON-APPOINTMENT (OUTPATIENT)
Age: 38
End: 2023-06-06

## 2023-06-06 LAB
APPEARANCE: CLEAR
BACTERIA: NEGATIVE /HPF
BILIRUBIN URINE: NEGATIVE
BLOOD URINE: NEGATIVE
CAST: 0 /LPF
CHOLEST SERPL-MCNC: 185 MG/DL
COLOR: YELLOW
EPITHELIAL CELLS: 0 /HPF
GLUCOSE QUALITATIVE U: NEGATIVE MG/DL
HDLC SERPL-MCNC: 37 MG/DL
KETONES URINE: NEGATIVE MG/DL
LDLC SERPL CALC-MCNC: 101 MG/DL
LEUKOCYTE ESTERASE URINE: NEGATIVE
MICROSCOPIC-UA: NORMAL
NITRITE URINE: NEGATIVE
NONHDLC SERPL-MCNC: 148 MG/DL
PH URINE: 5.5
PROTEIN URINE: NEGATIVE MG/DL
RED BLOOD CELLS URINE: 0 /HPF
SPECIFIC GRAVITY URINE: 1.02
TRIGL SERPL-MCNC: 234 MG/DL
TSH SERPL-ACNC: 1.95 UIU/ML
UROBILINOGEN URINE: 0.2 MG/DL
WHITE BLOOD CELLS URINE: 0 /HPF

## 2023-07-06 ENCOUNTER — TRANSCRIPTION ENCOUNTER (OUTPATIENT)
Age: 38
End: 2023-07-06

## 2023-08-02 ENCOUNTER — RX RENEWAL (OUTPATIENT)
Age: 38
End: 2023-08-02

## 2023-08-15 ENCOUNTER — RX RENEWAL (OUTPATIENT)
Age: 38
End: 2023-08-15

## 2023-08-28 ENCOUNTER — RX RENEWAL (OUTPATIENT)
Age: 38
End: 2023-08-28

## 2023-11-06 ENCOUNTER — NON-APPOINTMENT (OUTPATIENT)
Age: 38
End: 2023-11-06

## 2023-12-27 ENCOUNTER — NON-APPOINTMENT (OUTPATIENT)
Age: 38
End: 2023-12-27

## 2024-01-29 ENCOUNTER — RX RENEWAL (OUTPATIENT)
Age: 39
End: 2024-01-29

## 2024-02-26 ENCOUNTER — RX RENEWAL (OUTPATIENT)
Age: 39
End: 2024-02-26

## 2024-03-14 ENCOUNTER — APPOINTMENT (OUTPATIENT)
Dept: INTERNAL MEDICINE | Facility: CLINIC | Age: 39
End: 2024-03-14
Payer: COMMERCIAL

## 2024-03-14 VITALS
WEIGHT: 245 LBS | OXYGEN SATURATION: 92 % | BODY MASS INDEX: 36.29 KG/M2 | HEIGHT: 69 IN | HEART RATE: 107 BPM | TEMPERATURE: 98.5 F

## 2024-03-14 VITALS — DIASTOLIC BLOOD PRESSURE: 70 MMHG | SYSTOLIC BLOOD PRESSURE: 124 MMHG

## 2024-03-14 DIAGNOSIS — I10 ESSENTIAL (PRIMARY) HYPERTENSION: ICD-10-CM

## 2024-03-14 DIAGNOSIS — G25.81 RESTLESS LEGS SYNDROME: ICD-10-CM

## 2024-03-14 DIAGNOSIS — E78.5 HYPERLIPIDEMIA, UNSPECIFIED: ICD-10-CM

## 2024-03-14 PROCEDURE — 99213 OFFICE O/P EST LOW 20 MIN: CPT | Mod: 25

## 2024-03-14 RX ORDER — GABAPENTIN 300 MG/1
300 CAPSULE ORAL
Qty: 90 | Refills: 0 | Status: ACTIVE | COMMUNITY
Start: 2024-03-14 | End: 1900-01-01

## 2024-03-14 NOTE — HISTORY OF PRESENT ILLNESS
[FreeTextEntry1] : f/u htn, lipids [de-identified] : on norvasc and crestor feeling of urgency to stretch right leg and arm when sitting still and at night--no other symps pain in left elbow for sev wks--to see ortho wt gain noted

## 2024-03-14 NOTE — PHYSICAL EXAM
[Normal] : soft, non-tender, non-distended, no masses palpated, no HSM and normal bowel sounds [de-identified] : local left elbow pain

## 2024-03-18 ENCOUNTER — TRANSCRIPTION ENCOUNTER (OUTPATIENT)
Age: 39
End: 2024-03-18

## 2024-03-21 ENCOUNTER — APPOINTMENT (OUTPATIENT)
Dept: ORTHOPEDIC SURGERY | Facility: CLINIC | Age: 39
End: 2024-03-21
Payer: COMMERCIAL

## 2024-03-21 DIAGNOSIS — M25.562 PAIN IN RIGHT KNEE: ICD-10-CM

## 2024-03-21 DIAGNOSIS — M25.522 PAIN IN RIGHT ELBOW: ICD-10-CM

## 2024-03-21 DIAGNOSIS — M25.521 PAIN IN RIGHT ELBOW: ICD-10-CM

## 2024-03-21 DIAGNOSIS — M25.561 PAIN IN RIGHT KNEE: ICD-10-CM

## 2024-03-21 PROCEDURE — 99203 OFFICE O/P NEW LOW 30 MIN: CPT

## 2024-03-21 PROCEDURE — 73080 X-RAY EXAM OF ELBOW: CPT | Mod: 50

## 2024-03-21 RX ORDER — MELOXICAM 15 MG/1
15 TABLET ORAL
Qty: 21 | Refills: 0 | Status: ACTIVE | COMMUNITY
Start: 2024-03-21 | End: 1900-01-01

## 2024-03-21 NOTE — HISTORY OF PRESENT ILLNESS
[de-identified] : 03/21/2024 : JORY COMER  is a 38 year  old male who presents to the office for evaluation of his bilateral elbow pain.  He states he is a  and has been throwing a baseball for many years and recently over the last several months started developing worsening pain over the medial aspect of the elbow that really only bothers him when he throws a baseball and is alleviated with rest.  He states he went to have it looked at because it has been it continually and worsening pain in the last several months.  He states the left elbow couple weeks ago started having acute pain after he changed his brakes on his car.  He states the pain is over the lateral aspect of the elbow and is worse certain motions and activities.  He states he was unable to lift even light things a few days ago and has gradually gotten little better since then.  He is here for specialist opinion.  He denies any numbness or tingling distally.  He has no other complaints today.  He denies any acute traumatic injury.

## 2024-03-21 NOTE — DISCUSSION/SUMMARY
[de-identified] : Assessment: Patient a 38-year-old male with left lateral epicondylitis, right medial epicondylitis  Plan: I had a long discussion with the patient today regarding the nature of their diagnosis and treatment plan. We discussed the risks and benefits of no treatment as well as nonoperative and operative treatments.  I reviewed the x-rays today that revealed no acute bony pathology.  On examination today he has mild pain over the medial aspect of the right elbow and pain over the lateral aspect of the left elbow consistent with golfer and tennis elbow respectively.  At this time I am recommending a trial of conservative treatment including ice, heat, rest, Mobic 15 mg to be taken once daily with food for pain and inflammation, physical therapy and bracing for symptomatic relief.  GI precautions were discussed and a brace as well as stretching exercises were shown to the patient in the office today.  He will avoid exacerbating activities and will follow-up in 6 to 8 weeks for repeat evaluation to reassess.  If symptoms were to persist despite conservative treatment to discuss potential MRI versus injections pending reevaluation.  Patient seen and examined with Dr. Contreras today.   The patient verbalizes their understanding and agrees with the plan.  All questions were answered to their satisfaction.

## 2024-03-21 NOTE — PHYSICAL EXAM
[de-identified] : General: Awake, alert, no acute distress, Patient was cooperative and appropriate during the examination.  The patient is of normal weight for height and age.  Walks without an antalgic gait.  Left elbow Exam: Physical exam of the elbow demonstrates normal skin without signs of skin changes or abnormalities. No erythema, warmth, or joint effusion appreciated.    Sensation intact light touch C5-T1 Palpable radial pulse Radial/ulnar/median/axillary/musculocutaneous/AIN/PIN nerves grossly intact   Range of motion: Flexion-Extension 0-130 Pronation-Supination 85-85   Palpation: Exquisitely tender to palpation over the lateral epicondyle and common extensor origin Not tender palpation over the medial epicondyle Not tender to palpation over the radial head Not tender to palpation over the olecranon Not tender to palpation over the distal biceps insertion Not tender to palpation over the distal triceps insertion Not tender to palpation over the cubital tunnel   Strength testing: Elbow Flexion 5/5 Elbow Extension 5/5 Pronation 5/5 Supination 5/5   Special Tests: No varus/valgus laxity at 0 and 30 degrees of elbow flexion Moderate pain with resisted wrist/finger extension and forearm supination No pain with resisted wrist/finger flexion and forearm pronation Negative hook test Negative Tinel's over the carpal and cubital tunnels  Right elbow Exam: Physical exam of the elbow demonstrates normal skin without signs of skin changes or abnormalities. No erythema, warmth, or joint effusion appreciated.    Sensation intact light touch C5-T1 Palpable radial pulse Radial/ulnar/median/axillary/musculocutaneous/AIN/PIN nerves grossly intact   Range of motion: Flexion-Extension 0-130 Pronation-Supination 85-85   Palpation: Mildly tender to palpation over the medial epicondyle and common flexor origin Not tender palpation over the lateral epicondyle Not tender to palpation over the radial head Not tender to palpation over the olecranon Not tender to palpation over the distal biceps insertion Not tender to palpation over the distal triceps insertion Not tender to palpation over the cubital tunnel   Strength testing: Elbow Flexion 5/5 Elbow Extension 5/5 Pronation 5/5 Supination 5/5   Special Tests: No varus/valgus laxity at 0 and 30 degrees of elbow flexion Moderate pain with resisted wrist/finger flexion and forearm supination No pain with resisted wrist/finger extension and forearm pronation Negative hook test Negative Tinel's over the carpal and cubital tunnels [de-identified] : X-rays 3 views of the bilateral elbows taken in the office today on 3/21/2024 shows no acute fracture or dislocation.

## 2024-03-25 RX ORDER — SILDENAFIL 50 MG/1
50 TABLET ORAL
Qty: 10 | Refills: 3 | Status: ACTIVE | COMMUNITY
Start: 2021-04-29 | End: 1900-01-01

## 2024-05-14 ENCOUNTER — RX RENEWAL (OUTPATIENT)
Age: 39
End: 2024-05-14

## 2024-05-14 RX ORDER — ROSUVASTATIN CALCIUM 10 MG/1
10 TABLET, FILM COATED ORAL
Qty: 90 | Refills: 1 | Status: ACTIVE | COMMUNITY
Start: 2022-12-12 | End: 1900-01-01

## 2024-06-10 ENCOUNTER — RX RENEWAL (OUTPATIENT)
Age: 39
End: 2024-06-10

## 2024-06-11 RX ORDER — AMLODIPINE BESYLATE 10 MG/1
10 TABLET ORAL DAILY
Qty: 90 | Refills: 0 | Status: ACTIVE | COMMUNITY
Start: 2020-12-15 | End: 1900-01-01

## 2024-07-18 ENCOUNTER — APPOINTMENT (OUTPATIENT)
Dept: INTERNAL MEDICINE | Facility: CLINIC | Age: 39
End: 2024-07-18
Payer: COMMERCIAL

## 2024-07-18 ENCOUNTER — NON-APPOINTMENT (OUTPATIENT)
Age: 39
End: 2024-07-18

## 2024-07-18 VITALS
HEART RATE: 78 BPM | HEIGHT: 69 IN | DIASTOLIC BLOOD PRESSURE: 87 MMHG | WEIGHT: 240 LBS | OXYGEN SATURATION: 97 % | TEMPERATURE: 98.5 F | SYSTOLIC BLOOD PRESSURE: 131 MMHG | BODY MASS INDEX: 35.55 KG/M2

## 2024-07-18 DIAGNOSIS — I10 ESSENTIAL (PRIMARY) HYPERTENSION: ICD-10-CM

## 2024-07-18 DIAGNOSIS — R63.8 OTHER SYMPTOMS AND SIGNS CONCERNING FOOD AND FLUID INTAKE: ICD-10-CM

## 2024-07-18 DIAGNOSIS — E78.5 HYPERLIPIDEMIA, UNSPECIFIED: ICD-10-CM

## 2024-07-18 DIAGNOSIS — Z00.00 ENCOUNTER FOR GENERAL ADULT MEDICAL EXAMINATION W/OUT ABNORMAL FINDINGS: ICD-10-CM

## 2024-07-18 PROCEDURE — 99396 PREV VISIT EST AGE 40-64: CPT | Mod: 25

## 2024-07-18 PROCEDURE — 93000 ELECTROCARDIOGRAM COMPLETE: CPT

## 2024-07-18 PROCEDURE — 36415 COLL VENOUS BLD VENIPUNCTURE: CPT

## 2024-07-19 LAB
ALBUMIN SERPL ELPH-MCNC: 4.9 G/DL
ALP BLD-CCNC: 84 U/L
ALT SERPL-CCNC: 66 U/L
ANION GAP SERPL CALC-SCNC: 13 MMOL/L
APPEARANCE: CLEAR
AST SERPL-CCNC: 30 U/L
BACTERIA: NEGATIVE /HPF
BASOPHILS # BLD AUTO: 0.06 K/UL
BASOPHILS NFR BLD AUTO: 0.7 %
BILIRUB SERPL-MCNC: 1 MG/DL
BILIRUBIN URINE: NEGATIVE
BLOOD URINE: NEGATIVE
BUN SERPL-MCNC: 18 MG/DL
CALCIUM SERPL-MCNC: 9.4 MG/DL
CAST: 0 /LPF
CHLORIDE SERPL-SCNC: 102 MMOL/L
CHOLEST SERPL-MCNC: 193 MG/DL
CO2 SERPL-SCNC: 26 MMOL/L
COLOR: YELLOW
CREAT SERPL-MCNC: 1.14 MG/DL
EGFR: 83 ML/MIN/1.73M2
EOSINOPHIL # BLD AUTO: 0.23 K/UL
EOSINOPHIL NFR BLD AUTO: 2.7 %
EPITHELIAL CELLS: 0 /HPF
ESTIMATED AVERAGE GLUCOSE: 114 MG/DL
GLUCOSE QUALITATIVE U: NEGATIVE MG/DL
GLUCOSE SERPL-MCNC: 100 MG/DL
HBA1C MFR BLD HPLC: 5.6 %
HCT VFR BLD CALC: 49.2 %
HDLC SERPL-MCNC: 40 MG/DL
HGB BLD-MCNC: 15.3 G/DL
IMM GRANULOCYTES NFR BLD AUTO: 0.5 %
KETONES URINE: NEGATIVE MG/DL
LDLC SERPL CALC-MCNC: 125 MG/DL
LEUKOCYTE ESTERASE URINE: NEGATIVE
LYMPHOCYTES # BLD AUTO: 2.84 K/UL
LYMPHOCYTES NFR BLD AUTO: 32.9 %
MAN DIFF?: NORMAL
MCHC RBC-ENTMCNC: 27.9 PG
MCHC RBC-ENTMCNC: 31.1 GM/DL
MCV RBC AUTO: 89.6 FL
MICROSCOPIC-UA: NORMAL
MONOCYTES # BLD AUTO: 0.81 K/UL
MONOCYTES NFR BLD AUTO: 9.4 %
NEUTROPHILS # BLD AUTO: 4.66 K/UL
NEUTROPHILS NFR BLD AUTO: 53.8 %
NITRITE URINE: NEGATIVE
NONHDLC SERPL-MCNC: 153 MG/DL
PH URINE: 6.5
PLATELET # BLD AUTO: 220 K/UL
POTASSIUM SERPL-SCNC: 4 MMOL/L
PROT SERPL-MCNC: 7.5 G/DL
PROTEIN URINE: NEGATIVE MG/DL
RBC # BLD: 5.49 M/UL
RBC # FLD: 13.5 %
RED BLOOD CELLS URINE: 0 /HPF
SODIUM SERPL-SCNC: 141 MMOL/L
SPECIFIC GRAVITY URINE: 1.02
TRIGL SERPL-MCNC: 153 MG/DL
TSH SERPL-ACNC: 2.1 UIU/ML
UROBILINOGEN URINE: 1 MG/DL
WBC # FLD AUTO: 8.64 K/UL
WHITE BLOOD CELLS URINE: 0 /HPF

## 2024-07-22 LAB
HCV AB SER QL: NONREACTIVE
HCV S/CO RATIO: 0.06 S/CO

## 2024-08-07 ENCOUNTER — RX RENEWAL (OUTPATIENT)
Age: 39
End: 2024-08-07

## 2024-09-13 NOTE — PATIENT PROFILE ADULT - OVER THE PAST TWO WEEKS HAVE YOU FELT DOWN, DEPRESSED OR HOPELESS?
Medication: FOLIC ACID 1MG TABLETS  passed protocol.   Last office visit date: 7/3/2024  Next appointment scheduled?: No   Number of refills given: 0    Upcoming appointment scheduled on: Visit date not found   Per last office visit, patient is to follow up n/a.   Last refill on: 4/25/2024      
no

## 2024-12-16 ENCOUNTER — RX RENEWAL (OUTPATIENT)
Age: 39
End: 2024-12-16

## 2025-03-03 ENCOUNTER — RX RENEWAL (OUTPATIENT)
Age: 40
End: 2025-03-03

## 2025-04-15 ENCOUNTER — RX RENEWAL (OUTPATIENT)
Age: 40
End: 2025-04-15

## 2025-04-17 ENCOUNTER — NON-APPOINTMENT (OUTPATIENT)
Age: 40
End: 2025-04-17

## 2025-04-17 ENCOUNTER — APPOINTMENT (OUTPATIENT)
Dept: INTERNAL MEDICINE | Facility: CLINIC | Age: 40
End: 2025-04-17
Payer: COMMERCIAL

## 2025-04-17 VITALS
TEMPERATURE: 98.4 F | HEART RATE: 97 BPM | BODY MASS INDEX: 35.1 KG/M2 | RESPIRATION RATE: 16 BRPM | HEIGHT: 69 IN | WEIGHT: 237 LBS | OXYGEN SATURATION: 96 %

## 2025-04-17 VITALS — DIASTOLIC BLOOD PRESSURE: 78 MMHG | SYSTOLIC BLOOD PRESSURE: 122 MMHG

## 2025-04-17 DIAGNOSIS — I10 ESSENTIAL (PRIMARY) HYPERTENSION: ICD-10-CM

## 2025-04-17 DIAGNOSIS — E78.5 HYPERLIPIDEMIA, UNSPECIFIED: ICD-10-CM

## 2025-04-17 DIAGNOSIS — M10.9 GOUT, UNSPECIFIED: ICD-10-CM

## 2025-04-17 PROCEDURE — 36415 COLL VENOUS BLD VENIPUNCTURE: CPT

## 2025-04-17 PROCEDURE — 99213 OFFICE O/P EST LOW 20 MIN: CPT

## 2025-04-17 PROCEDURE — G2211 COMPLEX E/M VISIT ADD ON: CPT

## 2025-04-20 LAB
CHOLEST SERPL-MCNC: 199 MG/DL
HDLC SERPL-MCNC: 37 MG/DL
LDLC SERPL-MCNC: 121 MG/DL
NONHDLC SERPL-MCNC: 162 MG/DL
TRIGL SERPL-MCNC: 234 MG/DL

## 2025-04-28 ENCOUNTER — RX RENEWAL (OUTPATIENT)
Age: 40
End: 2025-04-28

## 2025-05-12 ENCOUNTER — RX RENEWAL (OUTPATIENT)
Age: 40
End: 2025-05-12

## 2025-06-09 ENCOUNTER — NON-APPOINTMENT (OUTPATIENT)
Age: 40
End: 2025-06-09